# Patient Record
Sex: FEMALE | Race: WHITE | Employment: OTHER | URBAN - METROPOLITAN AREA
[De-identification: names, ages, dates, MRNs, and addresses within clinical notes are randomized per-mention and may not be internally consistent; named-entity substitution may affect disease eponyms.]

---

## 2021-03-17 ENCOUNTER — TRANSCRIBE ORDER (OUTPATIENT)
Dept: REGISTRATION | Age: 71
End: 2021-03-17

## 2021-03-17 DIAGNOSIS — Z01.812 PRE-PROCEDURE LAB EXAM: Primary | ICD-10-CM

## 2021-03-23 ENCOUNTER — HOSPITAL ENCOUNTER (OUTPATIENT)
Dept: PREADMISSION TESTING | Age: 71
Discharge: HOME | End: 2021-03-23
Payer: MEDICARE

## 2021-03-23 VITALS
DIASTOLIC BLOOD PRESSURE: 76 MMHG | HEART RATE: 71 BPM | TEMPERATURE: 97.4 F | SYSTOLIC BLOOD PRESSURE: 127 MMHG | HEIGHT: 65 IN

## 2021-03-23 LAB
ABO + RH BLD: NORMAL
ANION GAP SERPL CALC-SCNC: 6 MMOL/L (ref 5–15)
APPEARANCE UR: CLEAR
BACTERIA URNS QL MICRO: NEGATIVE /HPF
BILIRUB UR QL: NEGATIVE
BLOOD GROUP ANTIBODIES SERPL: NORMAL
BUN SERPL-MCNC: 14 MG/DL (ref 6–20)
BUN/CREAT SERPL: 23 (ref 12–20)
CALCIUM SERPL-MCNC: 9.1 MG/DL (ref 8.5–10.1)
CHLORIDE SERPL-SCNC: 107 MMOL/L (ref 97–108)
CO2 SERPL-SCNC: 27 MMOL/L (ref 21–32)
COLOR UR: NORMAL
CREAT SERPL-MCNC: 0.61 MG/DL (ref 0.55–1.02)
EPITH CASTS URNS QL MICRO: NORMAL /LPF
ERYTHROCYTE [DISTWIDTH] IN BLOOD BY AUTOMATED COUNT: 12.6 % (ref 11.5–14.5)
GLUCOSE SERPL-MCNC: 84 MG/DL (ref 65–100)
GLUCOSE UR STRIP.AUTO-MCNC: NEGATIVE MG/DL
HCT VFR BLD AUTO: 41.5 % (ref 35–47)
HGB BLD-MCNC: 13.5 G/DL (ref 11.5–16)
HGB UR QL STRIP: NEGATIVE
INR PPP: 1 (ref 0.9–1.1)
KETONES UR QL STRIP.AUTO: NEGATIVE MG/DL
LEUKOCYTE ESTERASE UR QL STRIP.AUTO: NEGATIVE
MCH RBC QN AUTO: 29.6 PG (ref 26–34)
MCHC RBC AUTO-ENTMCNC: 32.5 G/DL (ref 30–36.5)
MCV RBC AUTO: 91 FL (ref 80–99)
NITRITE UR QL STRIP.AUTO: NEGATIVE
NRBC # BLD: 0 K/UL (ref 0–0.01)
NRBC BLD-RTO: 0 PER 100 WBC
PH UR STRIP: 6 [PH] (ref 5–8)
PLATELET # BLD AUTO: 335 K/UL (ref 150–400)
PMV BLD AUTO: 10 FL (ref 8.9–12.9)
POTASSIUM SERPL-SCNC: 4.1 MMOL/L (ref 3.5–5.1)
PROT UR STRIP-MCNC: NEGATIVE MG/DL
PROTHROMBIN TIME: 10.4 SEC (ref 9–11.1)
RBC # BLD AUTO: 4.56 M/UL (ref 3.8–5.2)
RBC #/AREA URNS HPF: NORMAL /HPF (ref 0–5)
SODIUM SERPL-SCNC: 140 MMOL/L (ref 136–145)
SP GR UR REFRACTOMETRY: 1.01 (ref 1–1.03)
SPECIMEN EXP DATE BLD: NORMAL
UA: UC IF INDICATED,UAUC: NORMAL
UROBILINOGEN UR QL STRIP.AUTO: 0.2 EU/DL (ref 0.2–1)
WBC # BLD AUTO: 6.7 K/UL (ref 3.6–11)
WBC URNS QL MICRO: NORMAL /HPF (ref 0–4)

## 2021-03-23 PROCEDURE — 80048 BASIC METABOLIC PNL TOTAL CA: CPT

## 2021-03-23 PROCEDURE — 81001 URINALYSIS AUTO W/SCOPE: CPT

## 2021-03-23 PROCEDURE — 93005 ELECTROCARDIOGRAM TRACING: CPT

## 2021-03-23 PROCEDURE — 85027 COMPLETE CBC AUTOMATED: CPT

## 2021-03-23 PROCEDURE — 85610 PROTHROMBIN TIME: CPT

## 2021-03-23 PROCEDURE — 83036 HEMOGLOBIN GLYCOSYLATED A1C: CPT

## 2021-03-23 PROCEDURE — 36415 COLL VENOUS BLD VENIPUNCTURE: CPT

## 2021-03-23 PROCEDURE — 86901 BLOOD TYPING SEROLOGIC RH(D): CPT

## 2021-03-23 RX ORDER — DEXTROMETHORPHAN HYDROBROMIDE, GUAIFENESIN 5; 100 MG/5ML; MG/5ML
650 LIQUID ORAL EVERY 8 HOURS
COMMUNITY

## 2021-03-23 RX ORDER — ACETAMINOPHEN 500 MG
1000 TABLET ORAL
COMMUNITY

## 2021-03-23 RX ORDER — FLUTICASONE PROPIONATE 50 MCG
2 SPRAY, SUSPENSION (ML) NASAL AS NEEDED
COMMUNITY

## 2021-03-23 RX ORDER — MECLIZINE HYDROCHLORIDE 25 MG/1
25 TABLET ORAL
COMMUNITY

## 2021-03-23 RX ORDER — METRONIDAZOLE 7.5 MG/G
0.75 CREAM TOPICAL 2 TIMES DAILY
COMMUNITY

## 2021-03-23 RX ORDER — BISMUTH SUBSALICYLATE 262 MG
1 TABLET,CHEWABLE ORAL DAILY
COMMUNITY

## 2021-03-23 RX ORDER — GUAIFENESIN 600 MG/1
600 TABLET, EXTENDED RELEASE ORAL AS NEEDED
COMMUNITY

## 2021-03-23 RX ORDER — PHENOL/SODIUM PHENOLATE
20 AEROSOL, SPRAY (ML) MUCOUS MEMBRANE DAILY
COMMUNITY

## 2021-03-23 RX ORDER — LIDOCAINE HYDROCHLORIDE 30 MG/G
CREAM TOPICAL 2 TIMES DAILY
COMMUNITY

## 2021-03-23 NOTE — PERIOP NOTES
PATIENT GIVEN SURGICAL SITE INFECTION FAQ HANDOUT AND HAND WASHING TIP SHEET. PREOP INSTRUCTIONS REVIEWED AND PATIENT VERBALIZES UNDERSTANDING OF INSTRUCTIONS. PATIENT HAS BEEN GIVEN THE OPPORTUNITY TO ASK ADDITIONAL QUESTIONS. GAVE PATIENT 2 BOTTLES OF CHG CLEANSER AND PATIENT VERBALIZED UNDERSTANDING. PATIENT CALLED AND MADE AWARE OF COVID-19 TESTING NEEDED TO BE DONE WITHIN 96 HOURS OF SURGERY. COVID-19 TESTING APPOINTMENT MADE FOR PATIENT. PATIENT INSTRUCTED ON SELF QUARANTINE BETWEEN TESTING AND ARRIVAL TIME DAY OF SURGERY. SENT REFERRAL TO [facility]. GAVE PATIENT PAPERWORK ON ADVANCED DIRECTIVES    PATIENT INFORMED OF COVID 23 VISIT POLICY, GAVE PATIENT LINK TO JOINT REPLACEMENT CLASS AND PATIENT SAYS SHE HAS ACCESS TO COMPUTER AND WILL DO ONLINE BEFORE SURGERY. PATIENT WILL GET WHEELCHAIR WITH WHEELS PRIOR TO SURGERY.

## 2021-03-24 LAB
ATRIAL RATE: 64 BPM
BACTERIA SPEC CULT: NORMAL
BACTERIA SPEC CULT: NORMAL
CALCULATED P AXIS, ECG09: 56 DEGREES
CALCULATED R AXIS, ECG10: -38 DEGREES
CALCULATED T AXIS, ECG11: 41 DEGREES
DIAGNOSIS, 93000: NORMAL
EST. AVERAGE GLUCOSE BLD GHB EST-MCNC: 114 MG/DL
HBA1C MFR BLD: 5.6 % (ref 4–5.6)
P-R INTERVAL, ECG05: 158 MS
Q-T INTERVAL, ECG07: 438 MS
QRS DURATION, ECG06: 106 MS
QTC CALCULATION (BEZET), ECG08: 451 MS
SERVICE CMNT-IMP: NORMAL
VENTRICULAR RATE, ECG03: 64 BPM

## 2021-03-24 NOTE — PERIOP NOTES
[facility] Nurse Practitioner   Pre-Operative Chart Review/Assessment:-ORTHOPEDIC                Patient Name:  Mireya Beyer                                                           Age:   66 y.o.    :  1950     Today's Date:  3/25/2021     Date of PAT:   3/23/2021      Date of Surgery:    3/31/2021      Procedure(s):  Left Total Knee Arthroplasty     Surgeon:   Dr. Beth Gamble                       PLAN:      1)  Medical Clearance:  Dr. Chema Ng      2)  Cardiac Clearance:  EKG and METs reviewed. No further cardiac testing requested. 3)  Diabetic Treatment Consult:  Not indicated. A1c-5.6      4)  Sleep Apnea evaluation:   HOSSEIN score of 5. Pt reports daytime fatigue and a diagnosis of HTN. Pt denies loud snoring that can be heard through a closed door and witnessed pauses in breathing. Referral sent to PCP for F/U and recommendations. 5) Treatment for MRSA/Staph Aureus:  Neg      6) Additional Concerns:  HTN, GERD/PUD, Asthma                Vital Signs:         Vitals:    21 1137   BP: 127/76   Pulse: 71   Temp: 97.4 °F (36.3 °C)   Height: 5' 5\" (1.651 m)            ____________________________________________  PAST MEDICAL HISTORY  Past Medical History:   Diagnosis Date    Asthma     Cancer ([facility] [address]      BCC X [facility] - DR. RAPHAEL     GERD (gastroesophageal reflux disease)     History of bronchitis     Hypertension     PUD (peptic ulcer disease)     Vertigo       ____________________________________________  PAST SURGICAL HISTORY  Past Surgical History:   Procedure Laterality Date    HX COLONOSCOPY      HX ENDOSCOPY      HX GYN  1975    OVARIAN CYST     HX HEART CATHETERIZATION      DR. Beth Gamble -[facility]OLOG[facility]    HX ORTHOPAEDIC Left 2012    LEFT KNEE SURGERY - TORN MENISCUS REPAIR    NEUROLOGICAL PROCEDURE UNLISTED      LUMBAR LAMINECTOMY L4 - DR. ALANIZ (NEURO) HERNIATED DISC REMOVED      ____________________________________________  [place] MEDICATIONS  Current Outpatient Medications   Medication Sig    hydroCHLOROthiazide 25 mg tab 25 mg, lisinopriL 20 mg tab 20 mg Take 1 Dose by mouth daily. PATIENT TAKES 1/2 PILL EVERY MORNING    Omeprazole delayed release (PRILOSEC D/R) 20 mg tablet Take 20 mg by mouth daily.  metroNIDAZOLE (METROCREAM) 0.75 % topical cream Apply 0.75 Tubes to affected area two (2) times a day. Use a thin layer to affected areas after washing    acetaminophen (Tylenol Extra Strength) 500 mg tablet Take 1,000 mg by mouth every six (6) hours as needed for Pain.  acetaminophen (Tylenol Arthritis Pain) 650 mg TbER Take 650 mg by mouth every eight (8) hours.  lidocaine HCL (XYLOCAINE) 3 % topical cream Apply  to affected area two (2) times a day.  multivitamin (ONE A DAY) tablet Take 1 Tab by mouth daily.  OTHER 2 Puffs every six (6) hours. LEVALBUTEROL TARTRATE INHALER  (200 PUFFS)    meclizine (ANTIVERT) 25 mg tablet Take 25 mg by mouth three (3) times daily as needed for Dizziness. Indications: sensation of spinning or whirling    guaiFENesin ER (Mucinex) 600 mg ER tablet Take 600 mg by mouth as needed for Congestion.  fluticasone propionate (Flonase Allergy Relief) 50 mcg/actuation nasal spray 2 Sprays by Both Nostrils route as needed.  As needed     No current facility-administered medications for this encounter.       ____________________________________________  ALLERGIES  Allergies   Allergen Reactions    Mold Cough     AND NOSE (NASAL PASSAGEWAYS -BURNS )    Penicillin G Rash    Sulfa (Sulfonamide Antibiotics) Rash      ____________________________________________  SOCIAL HISTORY  Social History     Tobacco Use    Smoking status: Never Smoker    Smokeless tobacco: Never Used   Substance Use Topics    Alcohol use: Never     Frequency: Never      ____________________________________________        Labs:     Hospital Outpatient Visit on 03/23/2021   Component Date Value Ref Range Status    Sodium 03/23/2021 140  136 - 145 mmol/L Final    Potassium 03/23/2021 4.1  3.5 - 5.1 mmol/L Final    Chloride 03/23/2021 107  97 - 108 mmol/L Final    CO2 03/23/2021 27  21 - 32 mmol/L Final    Anion gap 03/23/2021 6  5 - 15 mmol/L Final    Glucose 03/23/2021 84  65 - 100 mg/dL Final    BUN 03/23/2021 14  6 - 20 MG/DL Final    Creatinine 03/23/2021 0.61  0.55 - 1.02 MG/DL Final    BUN/Creatinine ratio 03/23/2021 23* 12 - 20   Final    GFR est AA 03/23/2021 >60  >60 ml/min/1.73m2 Final    GFR est non-AA 03/23/2021 >60  >60 ml/min/1.73m2 Final    Estimated GFR is calculated using the IDMS-traceable Modification of Diet in Renal Disease (MDRD) Study equation, reported for both  Americans (GFRAA) and non- Americans (GFRNA), and normalized to 1.73m2 body surface area. The physician must decide which value applies to the patient.  Calcium 03/23/2021 9.1  8.5 - 10.1 MG/DL Final    WBC 03/23/2021 6.7  3.6 - 11.0 K/uL Final    RBC 03/23/2021 4.56  3.80 - 5.20 M/uL Final    HGB 03/23/2021 13.5  11.5 - 16.0 g/dL Final    HCT 03/23/2021 41.5  35.0 - 47.0 % Final    MCV 03/23/2021 91.0  80.0 - 99.0 FL Final    MCH 03/23/2021 29.6  26.0 - 34.0 PG Final    MCHC 03/23/2021 32.5  30.0 - 36.5 g/dL Final    RDW 03/23/2021 12.6  11.5 - 14.5 % Final    PLATELET 26/06/9094 236  150 - 400 K/uL Final    MPV 03/23/2021 10.0  8.9 - 12.9 FL Final    NRBC 03/23/2021 0.0  0  WBC Final    ABSOLUTE NRBC 03/23/2021 0.00  0.00 - 0.01 K/uL Final    Crossmatch Expiration 03/23/2021 04/03/2021,2359   Final    ABO/Rh(D) 03/23/2021 O NEGATIVE   Final    Antibody screen 03/23/2021 NEG   Final    INR 03/23/2021 1.0  0.9 - 1.1   Final    A single therapeutic range for Vit K antagonists may not be optimal for all indications - see June, 2008 issue of Chest, American College of Chest Physicians Evidence-Based Clinical Practice Guidelines, 8th Edition.     Prothrombin time 03/23/2021 10.4  9.0 - 11.1 sec Final    Color 03/23/2021 YELLOW/STRAW    Final    Color Reference Range: Straw, Yellow or Dark Yellow    Appearance 03/23/2021 CLEAR  CLEAR   Final    Specific gravity 03/23/2021 1.008  1.003 - 1.030   Final    pH (UA) 03/23/2021 6.0  5.0 - 8.0   Final    Protein 03/23/2021 Negative  NEG mg/dL Final    Glucose 03/23/2021 Negative  NEG mg/dL Final    Ketone 03/23/2021 Negative  NEG mg/dL Final    Bilirubin 03/23/2021 Negative  NEG   Final    Blood 03/23/2021 Negative  NEG   Final    Urobilinogen 03/23/2021 0.2  0.2 - 1.0 EU/dL Final    Nitrites 03/23/2021 Negative  NEG   Final    Leukocyte Esterase 03/23/2021 Negative  NEG   Final    WBC 03/23/2021 0-4  0 - 4 /hpf Final    RBC 03/23/2021 0-5  0 - 5 /hpf Final    Epithelial cells 03/23/2021 FEW  FEW /lpf Final    Epithelial cell category consists of squamous cells and /or transitional urothelial cells. Renal tubular cells, if present, are separately identified as such.     Bacteria 03/23/2021 Negative  NEG /hpf Final    UA:UC IF INDICATED 03/23/2021 CULTURE NOT INDICATED BY UA RESULT  CNI   Final    Ventricular Rate 03/23/2021 64  BPM Final    Atrial Rate 03/23/2021 64  BPM Final    P-R Interval 03/23/2021 158  ms Final    QRS Duration 03/23/2021 106  ms Final    Q-T Interval 03/23/2021 438  ms Final    QTC Calculation (Bezet) 03/23/2021 451  ms Final    Calculated P Axis 03/23/2021 56  degrees Final    Calculated R Axis 03/23/2021 -38  degrees Final    Calculated T Axis 03/23/2021 41  degrees Final    Diagnosis 03/23/2021    Final                    Value:Normal sinus rhythm  Left axis deviation      Confirmed by Cee Lebron MD. ([id number]) on 3/24/2021 6:02:37 PM      Hemoglobin A1c 03/23/2021 5.6  4.0 - 5.6 % Final    Comment: NEW METHOD  PLEASE NOTE NEW REFERENCE RANGE  (NOTE)  HbA1C Interpretive Ranges  <5.7              Normal  5.7 - 6.4         Consider Prediabetes  >6.5              Consider Diabetes      Est. average glucose 03/23/2021 114  mg/dL Final   • Special Requests: 03/23/2021 NO SPECIAL REQUESTS    Final   • Culture result: 03/23/2021 MRSA NOT PRESENT    Final       Skin:     Denies open wounds, cuts, sores, rashes or other areas of concern in PAT assessment.          Val Srinivasan NP

## 2021-03-26 PROBLEM — M17.12 PRIMARY LOCALIZED OSTEOARTHRITIS OF LEFT KNEE: Status: ACTIVE | Noted: 2021-03-26

## 2021-03-27 ENCOUNTER — HOSPITAL ENCOUNTER (OUTPATIENT)
Dept: PREADMISSION TESTING | Age: 71
Discharge: HOME | End: 2021-03-27
Payer: MEDICARE

## 2021-03-27 DIAGNOSIS — Z01.812 PRE-PROCEDURE LAB EXAM: ICD-10-CM

## 2021-03-27 PROCEDURE — U0003 INFECTIOUS AGENT DETECTION BY NUCLEIC ACID (DNA OR RNA); SEVERE ACUTE RESPIRATORY SYNDROME CORONAVIRUS 2 (SARS-COV-2) (CORONAVIRUS DISEASE [COVID-19]), AMPLIFIED PROBE TECHNIQUE, MAKING USE OF HIGH THROUGHPUT TECHNOLOGIES AS DESCRIBED BY CMS-2020-01-R: HCPCS

## 2021-03-28 LAB — SARS-COV-2, COV2NT: NOT DETECTED

## 2021-03-31 ENCOUNTER — ANESTHESIA (OUTPATIENT)
Dept: SURGERY | Age: 71
DRG: 470 | End: 2021-03-31
Payer: MEDICARE

## 2021-03-31 ENCOUNTER — HOSPITAL ENCOUNTER (INPATIENT)
Age: 71
LOS: 1 days | Discharge: HOME HEALTH | DRG: 470 | End: 2021-04-01
Attending: ORTHOPAEDIC SURGERY | Admitting: ORTHOPAEDIC SURGERY
Payer: MEDICARE

## 2021-03-31 ENCOUNTER — APPOINTMENT (OUTPATIENT)
Dept: GENERAL RADIOLOGY | Age: 71
DRG: 470 | End: 2021-03-31
Attending: PHYSICIAN ASSISTANT
Payer: MEDICARE

## 2021-03-31 ENCOUNTER — ANESTHESIA EVENT (OUTPATIENT)
Dept: SURGERY | Age: 71
DRG: 470 | End: 2021-03-31
Payer: MEDICARE

## 2021-03-31 DIAGNOSIS — Z96.659 STATUS POST KNEE REPLACEMENT, UNSPECIFIED LATERALITY: Primary | ICD-10-CM

## 2021-03-31 LAB
GLUCOSE BLD STRIP.AUTO-MCNC: 115 MG/DL (ref 65–100)
SERVICE CMNT-IMP: ABNORMAL

## 2021-03-31 PROCEDURE — C1776 JOINT DEVICE (IMPLANTABLE): HCPCS | Performed by: ORTHOPAEDIC SURGERY

## 2021-03-31 PROCEDURE — 97530 THERAPEUTIC ACTIVITIES: CPT

## 2021-03-31 PROCEDURE — 77030003601 HC NDL NRV BLK BBMI -A

## 2021-03-31 PROCEDURE — 77030008684 HC TU ET CUF COVD -B: Performed by: ANESTHESIOLOGY

## 2021-03-31 PROCEDURE — 77030012935 HC DRSG AQUACEL BMS -B: Performed by: ORTHOPAEDIC SURGERY

## 2021-03-31 PROCEDURE — 74011250636 HC RX REV CODE- 250/636: Performed by: NURSE ANESTHETIST, CERTIFIED REGISTERED

## 2021-03-31 PROCEDURE — 2709999900 HC NON-CHARGEABLE SUPPLY

## 2021-03-31 PROCEDURE — 74011000258 HC RX REV CODE- 258: Performed by: PHYSICIAN ASSISTANT

## 2021-03-31 PROCEDURE — 97161 PT EVAL LOW COMPLEX 20 MIN: CPT

## 2021-03-31 PROCEDURE — 65270000029 HC RM PRIVATE

## 2021-03-31 PROCEDURE — 77030014077 HC TOWER MX CEM J&J -C: Performed by: ORTHOPAEDIC SURGERY

## 2021-03-31 PROCEDURE — 74011250636 HC RX REV CODE- 250/636

## 2021-03-31 PROCEDURE — C1713 ANCHOR/SCREW BN/BN,TIS/BN: HCPCS | Performed by: ORTHOPAEDIC SURGERY

## 2021-03-31 PROCEDURE — 74011250637 HC RX REV CODE- 250/637: Performed by: ANESTHESIOLOGY

## 2021-03-31 PROCEDURE — C9290 INJ, BUPIVACAINE LIPOSOME: HCPCS | Performed by: PHYSICIAN ASSISTANT

## 2021-03-31 PROCEDURE — 77030040922 HC BLNKT HYPOTHRM STRY -A

## 2021-03-31 PROCEDURE — 76210000000 HC OR PH I REC 2 TO 2.5 HR: Performed by: ORTHOPAEDIC SURGERY

## 2021-03-31 PROCEDURE — 74011000250 HC RX REV CODE- 250: Performed by: PHYSICIAN ASSISTANT

## 2021-03-31 PROCEDURE — 74011250636 HC RX REV CODE- 250/636: Performed by: PHYSICIAN ASSISTANT

## 2021-03-31 PROCEDURE — 77030018673: Performed by: ORTHOPAEDIC SURGERY

## 2021-03-31 PROCEDURE — 77030039497 HC CST PAD STERILE CHCS -A: Performed by: ORTHOPAEDIC SURGERY

## 2021-03-31 PROCEDURE — 76010000172 HC OR TIME 2.5 TO 3 HR INTENSV-TIER 1: Performed by: ORTHOPAEDIC SURGERY

## 2021-03-31 PROCEDURE — 74011250636 HC RX REV CODE- 250/636: Performed by: ANESTHESIOLOGY

## 2021-03-31 PROCEDURE — 77030012893

## 2021-03-31 PROCEDURE — 77030027138 HC INCENT SPIROMETER -A

## 2021-03-31 PROCEDURE — 77030005513 HC CATH URETH FOL11 MDII -B: Performed by: ORTHOPAEDIC SURGERY

## 2021-03-31 PROCEDURE — 77030006835 HC BLD SAW SAG STRY -B: Performed by: ORTHOPAEDIC SURGERY

## 2021-03-31 PROCEDURE — 77030019908 HC STETH ESOPH SIMS -A: Performed by: ANESTHESIOLOGY

## 2021-03-31 PROCEDURE — 97116 GAIT TRAINING THERAPY: CPT

## 2021-03-31 PROCEDURE — 76060000036 HC ANESTHESIA 2.5 TO 3 HR: Performed by: ORTHOPAEDIC SURGERY

## 2021-03-31 PROCEDURE — 82962 GLUCOSE BLOOD TEST: CPT

## 2021-03-31 PROCEDURE — 74011250637 HC RX REV CODE- 250/637: Performed by: PHYSICIAN ASSISTANT

## 2021-03-31 PROCEDURE — 73560 X-RAY EXAM OF KNEE 1 OR 2: CPT

## 2021-03-31 PROCEDURE — 77030031139 HC SUT VCRL2 J&J -A: Performed by: ORTHOPAEDIC SURGERY

## 2021-03-31 PROCEDURE — 77030008462 HC STPLR SKN PROX J&J -A: Performed by: ORTHOPAEDIC SURGERY

## 2021-03-31 PROCEDURE — 77030026438 HC STYL ET INTUB CARD -A: Performed by: ANESTHESIOLOGY

## 2021-03-31 PROCEDURE — 74011000250 HC RX REV CODE- 250: Performed by: NURSE ANESTHETIST, CERTIFIED REGISTERED

## 2021-03-31 PROCEDURE — 77030040361 HC SLV COMPR DVT MDII -B: Performed by: ORTHOPAEDIC SURGERY

## 2021-03-31 PROCEDURE — 77030035236 HC SUT PDS STRATFX BARB J&J -B: Performed by: ORTHOPAEDIC SURGERY

## 2021-03-31 PROCEDURE — 2709999900 HC NON-CHARGEABLE SUPPLY: Performed by: ORTHOPAEDIC SURGERY

## 2021-03-31 PROCEDURE — 77030028907 HC WRP KNEE WO BGS SOLM -B

## 2021-03-31 DEVICE — STEM FEM 14X30MM CEM ATTUNE: Type: IMPLANTABLE DEVICE | Site: KNEE | Status: FUNCTIONAL

## 2021-03-31 DEVICE — SMARTSET GHV GENTAMICIN HIGH VISCOSITY BONE CEMENT 40G
Type: IMPLANTABLE DEVICE | Site: KNEE | Status: FUNCTIONAL
Brand: SMARTSET

## 2021-03-31 DEVICE — BASEPLATE TIB SZ 5 FIX BEAR CO CHROM MOLYBDENUM TI ALLY END: Type: IMPLANTABLE DEVICE | Site: KNEE | Status: FUNCTIONAL

## 2021-03-31 DEVICE — INSERT TIB SZ 6 THK7MM KNEE POST STBL FIX BEAR ATTUNE: Type: IMPLANTABLE DEVICE | Site: KNEE | Status: FUNCTIONAL

## 2021-03-31 DEVICE — COMPONENT FEM SZ 6 L KNEE POST STBL CEM ATTUNE: Type: IMPLANTABLE DEVICE | Site: KNEE | Status: FUNCTIONAL

## 2021-03-31 DEVICE — KNEE K1 TOT HEMI STD CEM IMPL CAPPED SYNTHES: Type: IMPLANTABLE DEVICE | Site: KNEE | Status: FUNCTIONAL

## 2021-03-31 DEVICE — ATTUNE PATELLA MEDIALIZED DOME 35MM CEMENTED AOX
Type: IMPLANTABLE DEVICE | Site: KNEE | Status: FUNCTIONAL
Brand: ATTUNE

## 2021-03-31 RX ORDER — NEOSTIGMINE METHYLSULFATE 1 MG/ML
INJECTION INTRAVENOUS AS NEEDED
Status: DISCONTINUED | OUTPATIENT
Start: 2021-03-31 | End: 2021-03-31 | Stop reason: HOSPADM

## 2021-03-31 RX ORDER — OXYCODONE HYDROCHLORIDE 5 MG/1
10 TABLET ORAL
Status: DISCONTINUED | OUTPATIENT
Start: 2021-03-31 | End: 2021-04-01 | Stop reason: HOSPADM

## 2021-03-31 RX ORDER — SODIUM CHLORIDE 0.9 % (FLUSH) 0.9 %
5-40 SYRINGE (ML) INJECTION AS NEEDED
Status: DISCONTINUED | OUTPATIENT
Start: 2021-03-31 | End: 2021-03-31 | Stop reason: HOSPADM

## 2021-03-31 RX ORDER — SODIUM CHLORIDE 0.9 % (FLUSH) 0.9 %
5-40 SYRINGE (ML) INJECTION AS NEEDED
Status: DISCONTINUED | OUTPATIENT
Start: 2021-03-31 | End: 2021-04-01 | Stop reason: HOSPADM

## 2021-03-31 RX ORDER — FENTANYL CITRATE 50 UG/ML
INJECTION, SOLUTION INTRAMUSCULAR; INTRAVENOUS
Status: COMPLETED
Start: 2021-03-31 | End: 2021-03-31

## 2021-03-31 RX ORDER — SODIUM CHLORIDE 0.9 % (FLUSH) 0.9 %
5-40 SYRINGE (ML) INJECTION EVERY 8 HOURS
Status: DISCONTINUED | OUTPATIENT
Start: 2021-03-31 | End: 2021-03-31 | Stop reason: HOSPADM

## 2021-03-31 RX ORDER — KETAMINE HYDROCHLORIDE 10 MG/ML
INJECTION, SOLUTION INTRAMUSCULAR; INTRAVENOUS AS NEEDED
Status: DISCONTINUED | OUTPATIENT
Start: 2021-03-31 | End: 2021-03-31 | Stop reason: HOSPADM

## 2021-03-31 RX ORDER — LIDOCAINE HYDROCHLORIDE 10 MG/ML
0.1 INJECTION, SOLUTION EPIDURAL; INFILTRATION; INTRACAUDAL; PERINEURAL AS NEEDED
Status: DISCONTINUED | OUTPATIENT
Start: 2021-03-31 | End: 2021-03-31 | Stop reason: HOSPADM

## 2021-03-31 RX ORDER — CEFAZOLIN SODIUM 1 G/3ML
INJECTION, POWDER, FOR SOLUTION INTRAMUSCULAR; INTRAVENOUS AS NEEDED
Status: DISCONTINUED | OUTPATIENT
Start: 2021-03-31 | End: 2021-03-31

## 2021-03-31 RX ORDER — ONDANSETRON 2 MG/ML
4 INJECTION INTRAMUSCULAR; INTRAVENOUS
Status: DISPENSED | OUTPATIENT
Start: 2021-03-31 | End: 2021-04-01

## 2021-03-31 RX ORDER — LIDOCAINE HYDROCHLORIDE 20 MG/ML
INJECTION, SOLUTION EPIDURAL; INFILTRATION; INTRACAUDAL; PERINEURAL AS NEEDED
Status: DISCONTINUED | OUTPATIENT
Start: 2021-03-31 | End: 2021-03-31 | Stop reason: HOSPADM

## 2021-03-31 RX ORDER — ALBUTEROL SULFATE 0.83 MG/ML
2.5 SOLUTION RESPIRATORY (INHALATION)
Status: DISCONTINUED | OUTPATIENT
Start: 2021-04-01 | End: 2021-04-01 | Stop reason: HOSPADM

## 2021-03-31 RX ORDER — ROPIVACAINE HYDROCHLORIDE 5 MG/ML
30 INJECTION, SOLUTION EPIDURAL; INFILTRATION; PERINEURAL AS NEEDED
Status: DISCONTINUED | OUTPATIENT
Start: 2021-03-31 | End: 2021-03-31 | Stop reason: HOSPADM

## 2021-03-31 RX ORDER — ONDANSETRON 2 MG/ML
4 INJECTION INTRAMUSCULAR; INTRAVENOUS AS NEEDED
Status: DISCONTINUED | OUTPATIENT
Start: 2021-03-31 | End: 2021-03-31 | Stop reason: HOSPADM

## 2021-03-31 RX ORDER — FENTANYL CITRATE 50 UG/ML
INJECTION, SOLUTION INTRAMUSCULAR; INTRAVENOUS AS NEEDED
Status: DISCONTINUED | OUTPATIENT
Start: 2021-03-31 | End: 2021-03-31 | Stop reason: HOSPADM

## 2021-03-31 RX ORDER — MORPHINE SULFATE 2 MG/ML
2 INJECTION, SOLUTION INTRAMUSCULAR; INTRAVENOUS
Status: DISCONTINUED | OUTPATIENT
Start: 2021-03-31 | End: 2021-03-31 | Stop reason: HOSPADM

## 2021-03-31 RX ORDER — ONDANSETRON 2 MG/ML
INJECTION INTRAMUSCULAR; INTRAVENOUS
Status: COMPLETED
Start: 2021-03-31 | End: 2021-03-31

## 2021-03-31 RX ORDER — AMOXICILLIN 250 MG
1 CAPSULE ORAL 2 TIMES DAILY
Status: DISCONTINUED | OUTPATIENT
Start: 2021-03-31 | End: 2021-04-01 | Stop reason: HOSPADM

## 2021-03-31 RX ORDER — MIDAZOLAM HYDROCHLORIDE 1 MG/ML
1 INJECTION, SOLUTION INTRAMUSCULAR; INTRAVENOUS AS NEEDED
Status: DISCONTINUED | OUTPATIENT
Start: 2021-03-31 | End: 2021-03-31 | Stop reason: HOSPADM

## 2021-03-31 RX ORDER — PROPOFOL 10 MG/ML
INJECTION, EMULSION INTRAVENOUS AS NEEDED
Status: DISCONTINUED | OUTPATIENT
Start: 2021-03-31 | End: 2021-03-31 | Stop reason: HOSPADM

## 2021-03-31 RX ORDER — GLYCOPYRROLATE 0.2 MG/ML
INJECTION INTRAMUSCULAR; INTRAVENOUS AS NEEDED
Status: DISCONTINUED | OUTPATIENT
Start: 2021-03-31 | End: 2021-03-31 | Stop reason: HOSPADM

## 2021-03-31 RX ORDER — SODIUM CHLORIDE 9 MG/ML
125 INJECTION, SOLUTION INTRAVENOUS CONTINUOUS
Status: DISPENSED | OUTPATIENT
Start: 2021-03-31 | End: 2021-04-01

## 2021-03-31 RX ORDER — NALOXONE HYDROCHLORIDE 0.4 MG/ML
0.4 INJECTION, SOLUTION INTRAMUSCULAR; INTRAVENOUS; SUBCUTANEOUS AS NEEDED
Status: DISCONTINUED | OUTPATIENT
Start: 2021-03-31 | End: 2021-04-01 | Stop reason: HOSPADM

## 2021-03-31 RX ORDER — FLUTICASONE PROPIONATE 50 MCG
2 SPRAY, SUSPENSION (ML) NASAL
Status: DISCONTINUED | OUTPATIENT
Start: 2021-03-31 | End: 2021-04-01 | Stop reason: HOSPADM

## 2021-03-31 RX ORDER — SODIUM CHLORIDE 9 MG/ML
25 INJECTION, SOLUTION INTRAVENOUS CONTINUOUS
Status: DISCONTINUED | OUTPATIENT
Start: 2021-03-31 | End: 2021-03-31 | Stop reason: HOSPADM

## 2021-03-31 RX ORDER — MECLIZINE HCL 12.5 MG 12.5 MG/1
25 TABLET ORAL
Status: DISCONTINUED | OUTPATIENT
Start: 2021-03-31 | End: 2021-04-01 | Stop reason: HOSPADM

## 2021-03-31 RX ORDER — SODIUM CHLORIDE 0.9 % (FLUSH) 0.9 %
5-40 SYRINGE (ML) INJECTION EVERY 8 HOURS
Status: DISCONTINUED | OUTPATIENT
Start: 2021-03-31 | End: 2021-04-01 | Stop reason: HOSPADM

## 2021-03-31 RX ORDER — FENTANYL CITRATE 50 UG/ML
25 INJECTION, SOLUTION INTRAMUSCULAR; INTRAVENOUS
Status: DISCONTINUED | OUTPATIENT
Start: 2021-03-31 | End: 2021-03-31 | Stop reason: HOSPADM

## 2021-03-31 RX ORDER — HYDROMORPHONE HYDROCHLORIDE 2 MG/ML
INJECTION, SOLUTION INTRAMUSCULAR; INTRAVENOUS; SUBCUTANEOUS AS NEEDED
Status: DISCONTINUED | OUTPATIENT
Start: 2021-03-31 | End: 2021-03-31 | Stop reason: HOSPADM

## 2021-03-31 RX ORDER — MIDAZOLAM HYDROCHLORIDE 1 MG/ML
0.5 INJECTION, SOLUTION INTRAMUSCULAR; INTRAVENOUS
Status: DISCONTINUED | OUTPATIENT
Start: 2021-03-31 | End: 2021-03-31 | Stop reason: HOSPADM

## 2021-03-31 RX ORDER — OXYCODONE HYDROCHLORIDE 5 MG/1
5 TABLET ORAL AS NEEDED
Status: DISCONTINUED | OUTPATIENT
Start: 2021-03-31 | End: 2021-03-31 | Stop reason: HOSPADM

## 2021-03-31 RX ORDER — DEXAMETHASONE SODIUM PHOSPHATE 4 MG/ML
INJECTION, SOLUTION INTRA-ARTICULAR; INTRALESIONAL; INTRAMUSCULAR; INTRAVENOUS; SOFT TISSUE AS NEEDED
Status: DISCONTINUED | OUTPATIENT
Start: 2021-03-31 | End: 2021-03-31 | Stop reason: HOSPADM

## 2021-03-31 RX ORDER — ONDANSETRON 4 MG/1
4 TABLET, ORALLY DISINTEGRATING ORAL
Status: DISCONTINUED | OUTPATIENT
Start: 2021-03-31 | End: 2021-04-01 | Stop reason: HOSPADM

## 2021-03-31 RX ORDER — FAMOTIDINE 20 MG/1
20 TABLET, FILM COATED ORAL
Status: DISCONTINUED | OUTPATIENT
Start: 2021-03-31 | End: 2021-04-01 | Stop reason: HOSPADM

## 2021-03-31 RX ORDER — ACETAMINOPHEN 325 MG/1
650 TABLET ORAL ONCE
Status: COMPLETED | OUTPATIENT
Start: 2021-03-31 | End: 2021-03-31

## 2021-03-31 RX ORDER — POLYETHYLENE GLYCOL 3350 17 G/17G
17 POWDER, FOR SOLUTION ORAL DAILY
Status: DISCONTINUED | OUTPATIENT
Start: 2021-04-01 | End: 2021-04-01 | Stop reason: HOSPADM

## 2021-03-31 RX ORDER — FENTANYL CITRATE 50 UG/ML
50 INJECTION, SOLUTION INTRAMUSCULAR; INTRAVENOUS AS NEEDED
Status: DISCONTINUED | OUTPATIENT
Start: 2021-03-31 | End: 2021-03-31 | Stop reason: HOSPADM

## 2021-03-31 RX ORDER — ROPIVACAINE HYDROCHLORIDE 5 MG/ML
INJECTION, SOLUTION EPIDURAL; INFILTRATION; PERINEURAL
Status: COMPLETED | OUTPATIENT
Start: 2021-03-31 | End: 2021-03-31

## 2021-03-31 RX ORDER — HYDRALAZINE HYDROCHLORIDE 20 MG/ML
INJECTION INTRAMUSCULAR; INTRAVENOUS
Status: COMPLETED
Start: 2021-03-31 | End: 2021-03-31

## 2021-03-31 RX ORDER — PANTOPRAZOLE SODIUM 20 MG/1
20 TABLET, DELAYED RELEASE ORAL
Status: DISCONTINUED | OUTPATIENT
Start: 2021-04-01 | End: 2021-04-01 | Stop reason: HOSPADM

## 2021-03-31 RX ORDER — HYDROMORPHONE HYDROCHLORIDE 1 MG/ML
0.5 INJECTION, SOLUTION INTRAMUSCULAR; INTRAVENOUS; SUBCUTANEOUS
Status: ACTIVE | OUTPATIENT
Start: 2021-03-31 | End: 2021-04-01

## 2021-03-31 RX ORDER — DIPHENHYDRAMINE HYDROCHLORIDE 50 MG/ML
12.5 INJECTION, SOLUTION INTRAMUSCULAR; INTRAVENOUS AS NEEDED
Status: DISCONTINUED | OUTPATIENT
Start: 2021-03-31 | End: 2021-03-31 | Stop reason: HOSPADM

## 2021-03-31 RX ORDER — OXYCODONE HYDROCHLORIDE 5 MG/1
5 TABLET ORAL
Status: DISCONTINUED | OUTPATIENT
Start: 2021-03-31 | End: 2021-04-01 | Stop reason: HOSPADM

## 2021-03-31 RX ORDER — SODIUM CHLORIDE, SODIUM LACTATE, POTASSIUM CHLORIDE, CALCIUM CHLORIDE 600; 310; 30; 20 MG/100ML; MG/100ML; MG/100ML; MG/100ML
100 INJECTION, SOLUTION INTRAVENOUS CONTINUOUS
Status: DISCONTINUED | OUTPATIENT
Start: 2021-03-31 | End: 2021-03-31 | Stop reason: HOSPADM

## 2021-03-31 RX ORDER — FACIAL-BODY WIPES
10 EACH TOPICAL DAILY PRN
Status: DISCONTINUED | OUTPATIENT
Start: 2021-04-02 | End: 2021-04-01 | Stop reason: HOSPADM

## 2021-03-31 RX ORDER — HYDROMORPHONE HYDROCHLORIDE 1 MG/ML
0.2 INJECTION, SOLUTION INTRAMUSCULAR; INTRAVENOUS; SUBCUTANEOUS
Status: DISCONTINUED | OUTPATIENT
Start: 2021-03-31 | End: 2021-03-31 | Stop reason: HOSPADM

## 2021-03-31 RX ORDER — ASPIRIN 325 MG
325 TABLET ORAL 2 TIMES DAILY
Status: DISCONTINUED | OUTPATIENT
Start: 2021-03-31 | End: 2021-04-01 | Stop reason: HOSPADM

## 2021-03-31 RX ORDER — ROCURONIUM BROMIDE 10 MG/ML
INJECTION, SOLUTION INTRAVENOUS AS NEEDED
Status: DISCONTINUED | OUTPATIENT
Start: 2021-03-31 | End: 2021-03-31 | Stop reason: HOSPADM

## 2021-03-31 RX ORDER — HYDROXYZINE HYDROCHLORIDE 10 MG/1
10 TABLET, FILM COATED ORAL
Status: DISCONTINUED | OUTPATIENT
Start: 2021-03-31 | End: 2021-04-01 | Stop reason: HOSPADM

## 2021-03-31 RX ORDER — MIDAZOLAM HYDROCHLORIDE 1 MG/ML
INJECTION, SOLUTION INTRAMUSCULAR; INTRAVENOUS AS NEEDED
Status: DISCONTINUED | OUTPATIENT
Start: 2021-03-31 | End: 2021-03-31 | Stop reason: HOSPADM

## 2021-03-31 RX ORDER — ACETAMINOPHEN 325 MG/1
650 TABLET ORAL EVERY 6 HOURS
Status: DISCONTINUED | OUTPATIENT
Start: 2021-03-31 | End: 2021-04-01 | Stop reason: HOSPADM

## 2021-03-31 RX ORDER — SODIUM CHLORIDE, SODIUM LACTATE, POTASSIUM CHLORIDE, CALCIUM CHLORIDE 600; 310; 30; 20 MG/100ML; MG/100ML; MG/100ML; MG/100ML
25 INJECTION, SOLUTION INTRAVENOUS CONTINUOUS
Status: DISCONTINUED | OUTPATIENT
Start: 2021-03-31 | End: 2021-03-31 | Stop reason: HOSPADM

## 2021-03-31 RX ORDER — METOPROLOL TARTRATE 5 MG/5ML
INJECTION INTRAVENOUS AS NEEDED
Status: DISCONTINUED | OUTPATIENT
Start: 2021-03-31 | End: 2021-03-31 | Stop reason: HOSPADM

## 2021-03-31 RX ORDER — ONDANSETRON 2 MG/ML
INJECTION INTRAMUSCULAR; INTRAVENOUS AS NEEDED
Status: DISCONTINUED | OUTPATIENT
Start: 2021-03-31 | End: 2021-03-31 | Stop reason: HOSPADM

## 2021-03-31 RX ORDER — HYDRALAZINE HYDROCHLORIDE 20 MG/ML
10 INJECTION INTRAMUSCULAR; INTRAVENOUS ONCE
Status: COMPLETED | OUTPATIENT
Start: 2021-03-31 | End: 2021-03-31

## 2021-03-31 RX ORDER — GUAIFENESIN 600 MG/1
600 TABLET, EXTENDED RELEASE ORAL
Status: DISCONTINUED | OUTPATIENT
Start: 2021-03-31 | End: 2021-04-01 | Stop reason: HOSPADM

## 2021-03-31 RX ADMIN — ASPIRIN 325 MG ORAL TABLET 325 MG: 325 PILL ORAL at 17:44

## 2021-03-31 RX ADMIN — METOPROLOL TARTRATE 2 MG: 5 INJECTION, SOLUTION INTRAVENOUS at 11:10

## 2021-03-31 RX ADMIN — FENTANYL CITRATE 25 MCG: 50 INJECTION, SOLUTION INTRAMUSCULAR; INTRAVENOUS at 13:05

## 2021-03-31 RX ADMIN — ONDANSETRON 4 MG: 2 INJECTION INTRAMUSCULAR; INTRAVENOUS at 13:07

## 2021-03-31 RX ADMIN — TRANEXAMIC ACID 1 G: 100 INJECTION, SOLUTION INTRAVENOUS at 09:52

## 2021-03-31 RX ADMIN — DEXAMETHASONE SODIUM PHOSPHATE 4 MG: 4 INJECTION, SOLUTION INTRAMUSCULAR; INTRAVENOUS at 09:33

## 2021-03-31 RX ADMIN — SODIUM CHLORIDE, POTASSIUM CHLORIDE, SODIUM LACTATE AND CALCIUM CHLORIDE 25 ML/HR: 600; 310; 30; 20 INJECTION, SOLUTION INTRAVENOUS at 08:59

## 2021-03-31 RX ADMIN — SODIUM CHLORIDE: 900 INJECTION, SOLUTION INTRAVENOUS at 11:37

## 2021-03-31 RX ADMIN — DOCUSATE SODIUM 50 MG AND SENNOSIDES 8.6 MG 1 TABLET: 8.6; 5 TABLET, FILM COATED ORAL at 17:44

## 2021-03-31 RX ADMIN — MIDAZOLAM 2 MG: 1 INJECTION INTRAMUSCULAR; INTRAVENOUS at 09:21

## 2021-03-31 RX ADMIN — WATER 2 G: 1 INJECTION INTRAMUSCULAR; INTRAVENOUS; SUBCUTANEOUS at 09:50

## 2021-03-31 RX ADMIN — FENTANYL CITRATE 25 MCG: 50 INJECTION, SOLUTION INTRAMUSCULAR; INTRAVENOUS at 13:40

## 2021-03-31 RX ADMIN — ACETAMINOPHEN 650 MG: 325 TABLET ORAL at 15:37

## 2021-03-31 RX ADMIN — ACETAMINOPHEN 650 MG: 325 TABLET ORAL at 08:13

## 2021-03-31 RX ADMIN — Medication 10 ML: at 23:20

## 2021-03-31 RX ADMIN — METOPROLOL TARTRATE 2 MG: 5 INJECTION, SOLUTION INTRAVENOUS at 10:50

## 2021-03-31 RX ADMIN — HYDRALAZINE HYDROCHLORIDE 10 MG: 20 INJECTION INTRAMUSCULAR; INTRAVENOUS at 13:06

## 2021-03-31 RX ADMIN — CEFAZOLIN SODIUM 2 G: 1 INJECTION, POWDER, FOR SOLUTION INTRAMUSCULAR; INTRAVENOUS at 23:08

## 2021-03-31 RX ADMIN — NEOSTIGMINE METHYLSULFATE 3 MG: 1 INJECTION, SOLUTION INTRAVENOUS at 11:31

## 2021-03-31 RX ADMIN — HYDROMORPHONE HYDROCHLORIDE 0.5 MG: 2 INJECTION, SOLUTION INTRAMUSCULAR; INTRAVENOUS; SUBCUTANEOUS at 11:15

## 2021-03-31 RX ADMIN — MIDAZOLAM 0.5 MG: 1 INJECTION INTRAMUSCULAR; INTRAVENOUS at 13:50

## 2021-03-31 RX ADMIN — FENTANYL CITRATE 50 MCG: 50 INJECTION, SOLUTION INTRAMUSCULAR; INTRAVENOUS at 09:28

## 2021-03-31 RX ADMIN — ONDANSETRON 4 MG: 2 INJECTION INTRAMUSCULAR; INTRAVENOUS at 15:52

## 2021-03-31 RX ADMIN — SODIUM CHLORIDE 125 ML/HR: 9 INJECTION, SOLUTION INTRAVENOUS at 19:48

## 2021-03-31 RX ADMIN — MIDAZOLAM HYDROCHLORIDE 2 MG: 1 INJECTION, SOLUTION INTRAMUSCULAR; INTRAVENOUS at 08:45

## 2021-03-31 RX ADMIN — ACETAMINOPHEN 650 MG: 325 TABLET ORAL at 20:15

## 2021-03-31 RX ADMIN — GLYCOPYRROLATE 0.4 MG: 0.2 INJECTION, SOLUTION INTRAMUSCULAR; INTRAVENOUS at 11:31

## 2021-03-31 RX ADMIN — KETAMINE HYDROCHLORIDE 30 MG: 10 INJECTION, SOLUTION INTRAMUSCULAR; INTRAVENOUS at 09:28

## 2021-03-31 RX ADMIN — ONDANSETRON HYDROCHLORIDE 4 MG: 2 INJECTION, SOLUTION INTRAMUSCULAR; INTRAVENOUS at 11:31

## 2021-03-31 RX ADMIN — METOPROLOL TARTRATE 1 MG: 5 INJECTION, SOLUTION INTRAVENOUS at 12:14

## 2021-03-31 RX ADMIN — PROPOFOL 130 MG: 10 INJECTION, EMULSION INTRAVENOUS at 09:28

## 2021-03-31 RX ADMIN — ROPIVACAINE HYDROCHLORIDE 20 ML: 5 INJECTION, SOLUTION EPIDURAL; INFILTRATION; PERINEURAL at 08:59

## 2021-03-31 RX ADMIN — FENTANYL CITRATE 50 MCG: 50 INJECTION, SOLUTION INTRAMUSCULAR; INTRAVENOUS at 10:25

## 2021-03-31 RX ADMIN — OXYCODONE 5 MG: 5 TABLET ORAL at 22:43

## 2021-03-31 RX ADMIN — HYDROMORPHONE HYDROCHLORIDE 0.5 MG: 2 INJECTION, SOLUTION INTRAMUSCULAR; INTRAVENOUS; SUBCUTANEOUS at 09:38

## 2021-03-31 RX ADMIN — CEFAZOLIN SODIUM 2 G: 1 INJECTION, POWDER, FOR SOLUTION INTRAMUSCULAR; INTRAVENOUS at 15:38

## 2021-03-31 RX ADMIN — LIDOCAINE HYDROCHLORIDE 40 MG: 20 INJECTION, SOLUTION EPIDURAL; INFILTRATION; INTRACAUDAL; PERINEURAL at 09:28

## 2021-03-31 RX ADMIN — FENTANYL CITRATE 50 MCG: 50 INJECTION, SOLUTION INTRAMUSCULAR; INTRAVENOUS at 08:45

## 2021-03-31 RX ADMIN — ROCURONIUM BROMIDE 50 MG: 10 SOLUTION INTRAVENOUS at 09:28

## 2021-03-31 RX ADMIN — HYDROMORPHONE HYDROCHLORIDE 0.5 MG: 2 INJECTION, SOLUTION INTRAMUSCULAR; INTRAVENOUS; SUBCUTANEOUS at 10:27

## 2021-03-31 RX ADMIN — PROPOFOL 30 MG: 10 INJECTION, EMULSION INTRAVENOUS at 10:39

## 2021-03-31 RX ADMIN — PROPOFOL 20 MG: 10 INJECTION, EMULSION INTRAVENOUS at 10:25

## 2021-03-31 RX ADMIN — KETAMINE HYDROCHLORIDE 20 MG: 10 INJECTION, SOLUTION INTRAMUSCULAR; INTRAVENOUS at 10:39

## 2021-03-31 NOTE — ANESTHESIA POSTPROCEDURE EVALUATION
Post-Anesthesia Evaluation and Assessment    Patient: Adalgisa Zavala MRN: [id number]  SSN: [id number]    YOB: 1950  Age: 66 y.o. Sex: female      I have evaluated the patient and they are stable and ready for discharge from the PACU. Cardiovascular Function/Vital Signs  Visit Vitals  BP (!) 149/59   Pulse 90   Temp 36.4 °C (97.6 °F)   Resp 22   Ht 5' 5\" (1.651 m)   Wt 91.6 kg (202 lb)   SpO2 98%   BMI 33.61 kg/m²       Patient is status post General anesthesia for Procedure(s):  LEFT TOTAL KNEE ARTHROPLASTY. Nausea/Vomiting: None    Postoperative hydration reviewed and adequate. Pain:  Pain Scale 1: (pt states pain is at tolerable level) (03/31/21 1400)  Pain Intensity 1: 4 (03/31/21 1340)   Managed    Neurological Status:   Neuro (WDL): Exceptions to WDL (03/31/21 1400)  Neuro  Neurologic State: Drowsy (03/31/21 1400)  Orientation Level: Oriented X4 (03/31/21 1400)  Cognition: Follows commands (03/31/21 1400)  Speech: Clear (03/31/21 1400)  LUE Motor Response: Purposeful (03/31/21 1400)  LLE Motor Response: Purposeful (03/31/21 1400)  RUE Motor Response: Purposeful (03/31/21 1400)  RLE Motor Response: Purposeful (03/31/21 1400)   At baseline    Mental Status, Level of Consciousness: Alert and  oriented to person, place, and time    Pulmonary Status:   O2 Device: Nasal cannula (03/31/21 1400)   Adequate oxygenation and airway patent    Complications related to anesthesia: None    Post-anesthesia assessment completed. No concerns    Signed By: Fallon Sanchez MD     March 31, 2021              Procedure(s):  LEFT TOTAL KNEE ARTHROPLASTY. general    <BSHSIANPOST>    INITIAL Post-op Vital signs:   Vitals Value Taken Time   /59 03/31/21 1400   Temp 36.4 °C (97.6 °F) 03/31/21 1400   Pulse 90 03/31/21 1405   Resp 22 03/31/21 1405   SpO2 96 % 03/31/21 1408   Vitals shown include unvalidated device data.

## 2021-03-31 NOTE — ANESTHESIA PREPROCEDURE EVALUATION
Relevant Problems   No relevant active problems       Anesthetic History   No history of anesthetic complications            Review of Systems / Medical History  Patient summary reviewed, nursing notes reviewed and pertinent labs reviewed    Pulmonary            Asthma        Neuro/Psych   Within defined limits           Cardiovascular    Hypertension              Exercise tolerance: >4 METS     GI/Hepatic/Renal     GERD      PUD     Endo/Other        Arthritis     Other Findings              Physical Exam    Airway  Mallampati: III  TM Distance: 4 - 6 cm  Neck ROM: normal range of motion   Mouth opening: Normal     Cardiovascular  Regular rate and rhythm,  S1 and S2 normal,  no murmur, click, rub, or gallop             Dental  No notable dental hx       Pulmonary  Breath sounds clear to auscultation               Abdominal  GI exam deferred       Other Findings            Anesthetic Plan    ASA: 2  Anesthesia type: general      Post-op pain plan if not by surgeon: peripheral nerve block single    Induction: Intravenous  Anesthetic plan and risks discussed with: Patient      Patient refused spinal

## 2021-03-31 NOTE — ANESTHESIA PROCEDURE NOTES
Peripheral Block    Start time: 3/31/2021 8:54 AM  End time: 3/31/2021 8:58 AM  Performed by: Kayleigh Kelly MD  Authorized by: Kayleigh Kelly MD       Pre-procedure: Indications: at surgeon's request and post-op pain management    Preanesthetic Checklist: patient identified, risks and benefits discussed, site marked, timeout performed and patient being monitored      Block Type:   Block Type:   Adductor canal  Laterality:  Left  Monitoring:  Standard ASA monitoring, continuous pulse ox, frequent vital sign checks, heart rate, responsive to questions and oxygen  Injection Technique:  Single shot  Procedures: ultrasound guided    Patient Position: supine  Prep: chlorhexidine    Location:  Lower thigh  Needle Type:  Stimuplex  Needle Gauge:  22 G  Needle Localization:  Ultrasound guidance  Medication Injected:  Ropivacaine (PF) (NAROPIN)(0.5%) 5 mg/mL injection, 20 mL  Med Admin Time: 3/31/2021 8:59 AM    Assessment:  Number of attempts:  1  Injection Assessment:  Incremental injection every 5 mL, no paresthesia, ultrasound image on chart, no intravascular symptoms, negative aspiration for blood and local visualized surrounding nerve on ultrasound  Patient tolerance:  Patient tolerated the procedure well with no immediate complications

## 2021-03-31 NOTE — PROGRESS NOTES
TRANSFER - IN REPORT:    Verbal report received from Betsy Albright RN(name) on Moses Aguirre  being received from PACU(unit) for routine post - op      Report consisted of patients Situation, Background, Assessment and   Recommendations(SBAR). Information from the following report(s) SBAR, Kardex and OR Summary was reviewed with the receiving nurse. Opportunity for questions and clarification was provided. Assessment completed upon patients arrival to unit and care assumed.

## 2021-03-31 NOTE — H&P
Patient ID: Shubham Cardenas is a 65 y.o. female. Chief Complaint: Pain of the Left Knee    Shubham Cardenas is a 65 y.o. female who returns for follow up of left knee pain. She has a known history of DJD of the bilateral knees. Patient was last seen on 11/25/19, at which time I injected the left hip with cortisone for trochanteric bursitis. Today, she reports ongoing left knee pain. She notes instability in the left knee, particularly when going up steps. She is status post lumbar surgery at L4-5 for sciatica. She notes radicular LLE pain/numbness down her entire LE. Symptoms have been worsening over the past 3-4 months in relation to her sciatica and her arthritic knee pain. Review of Systems   2/22/2021    Constitutional: Unexplained: Negative  Genitourinary: Frequent Urination: Negative  HEENT: Vision Loss: Negative  Neurological: Memory Loss: Negative  Integumentary: Rash: Negative  Cardiovascular: Palpatations: Negative  Hematologic: Bruises/Bleeds Easily: Negative  Gastrointestinal: Constipation: Negative  Immunological: Seasonal Allergies: Positive  Musculoskeletal: Joint Pain: Positive    Past Medical History:   Diagnosis Date    Asthma    GERD (gastroesophageal reflux disease)    Hypertension     Past Surgical History:   Procedure Laterality Date    KNEE SURGERY    NO RELEVANT SURGERIES     Objective: There were no vitals filed for this visit. Constitutional: No acute distress. Well nourished. Well developed. Eyes: Sclera are nonicteric. Respiratory: No labored breathing. Cardiovascular: No marked edema. Skin: No marked skin ulcers. Neurological: No marked sensory loss noted. Psychiatric: Alert and oriented x3. Musculoskeletal     Examination of the LEFT knee reveals ROM is 2-119 degrees with a valgus deformity. Skin intact. The cruciate and collateral ligaments are stable. No effusion. No sign of infection. No ecchymosis or erythema. No cellulitis or rash.  No calf pain, swelling, or other evidence of DVT. I detect no obvious motor or sensory deficits in the lower extremities. The extensor mechanism is intact. The neurovascular status is intact. Imaging/Studies:   Order: XR KNEE 3 VW LEFT - Indication: Chronic pain of left knee      X-ray knee left 3 views ([id number])    Result Date: 2/22/2021  Weight Bearing. Views: Standing AP, Lat, [place]. Impression: I ordered and interpreted x-rays of the LEFT knee which reveal advanced degenerative arthritis in the medial, lateral, and patellofemoral compartments. Marked lateral comparment degenerative changes. Valgus defromity. No fractures or evidence of metastatic disease. Assessment:   There is no problem list on file for this patient. ICD-10-CM   1. Chronic pain of left knee M25.562   G89.29   2. Primary osteoarthritis of left knee M17.12   3. Acquired valgus deformity knee, left M21.062   4. Sciatica of left side M54.32     Plan:   I personally reviewed my findings with the patient. I reviewed the pathophysiology and explained that she has quite severe left knee osteoarthritis seen on x-rays today. This would explain why she is having knee pain. She does also have sciatica in relation to lumbar related issues causing radicular symptoms in her LLE. I advised her the area of numbness over her left lateral shin is likely stemming from her sciatica. We discussed long-term relief for her knee pain with left total knee arthroplasty. I explained the hospitalization, post-op and rehabilitation for the procedure. We discussed all complications associated with the surgery, including the chance of infection, DVT, and pulmonary embolus. I explained the use of antibiotics and anti-coagulants following surgery to prevent infection and DVT and we discussed the course of post-op physical therapy for rehabilitation. PROCEDURE: Left total knee replacement. Date of Surgery Update:  Mason Griffin was seen and examined.   Past Medical History:   Diagnosis Date    Asthma     Cancer ([facility] [address]      BCC X [facility] - DR. RAPHAEL     GERD (gastroesophageal reflux disease)     History of bronchitis     Hypertension     PUD (peptic ulcer disease)     Vertigo      Prior to Admission Medications   Prescriptions Last Dose Informant Patient Reported? Taking? OTHER   Yes No   Si Puffs every six (6) hours. LEVALBUTEROL TARTRATE INHALER  (200 PUFFS)   Omeprazole delayed release (PRILOSEC D/R) 20 mg tablet   Yes No   Sig: Take 20 mg by mouth daily. acetaminophen (Tylenol Arthritis Pain) 650 mg TbER   Yes No   Sig: Take 650 mg by mouth every eight (8) hours. acetaminophen (Tylenol Extra Strength) 500 mg tablet   Yes No   Sig: Take 1,000 mg by mouth every six (6) hours as needed for Pain. fluticasone propionate (Flonase Allergy Relief) 50 mcg/actuation nasal spray   Yes No   Si Sprays by Both Nostrils route as needed. As needed   guaiFENesin ER (Mucinex) 600 mg ER tablet   Yes No   Sig: Take 600 mg by mouth as needed for Congestion. hydroCHLOROthiazide 25 mg tab 25 mg, lisinopriL 20 mg tab 20 mg   Yes No   Sig: Take 1 Dose by mouth daily. PATIENT TAKES 1/2 PILL EVERY MORNING   lidocaine HCL (XYLOCAINE) 3 % topical cream   Yes No   Sig: Apply  to affected area two (2) times a day. meclizine (ANTIVERT) 25 mg tablet   Yes No   Sig: Take 25 mg by mouth three (3) times daily as needed for Dizziness. Indications: sensation of spinning or whirling   metroNIDAZOLE (METROCREAM) 0.75 % topical cream   Yes No   Sig: Apply 0.75 Tubes to affected area two (2) times a day. Use a thin layer to affected areas after washing   multivitamin (ONE A DAY) tablet   Yes No   Sig: Take 1 Tab by mouth daily. Facility-Administered Medications: None      Allergy to:Mold, Penicillin g, and Sulfa (sulfonamide antibiotics)  Physical Examination: General appearance - alert, well appearing, and in no distress  History and physical has been reviewed.  The patient has been examined.  There have been no significant clinical changes since the completion of the originally dated History and Physical.    Signed By: Easton Junior PA-C     March 31, 2021 7:33 AM

## 2021-03-31 NOTE — PERIOP NOTES
TRANSFER - OUT REPORT:    Verbal report given to Jann Chamorro (name) on Boone Elm  being transferred to  (unit) for routine post - op       Report consisted of patients Situation, Background, Assessment and   Recommendations(SBAR). Time Pre op antibiotic given:0950  Anesthesia Stop time: 0413  Mortensen Present on Transfer to floor:no  Order for Mortensen on Chart:no  Discharge Prescriptions with Chart:no    Information from the following report(s) SBAR, OR Summary, Procedure Summary, Intake/Output, MAR, Recent Results and Cardiac Rhythm NSR was reviewed with the receiving nurse. Opportunity for questions and clarification was provided. Is the patient on 02? YES       L/Min 2       Other nc    Is the patient on a monitor? NO    Is the nurse transporting with the patient? NO    Surgical Waiting Area notified of patient's transfer from PACU? YES      The following personal items collected during your admission accompanied patient upon transfer:   Dental Appliance: Dental Appliances: None  Vision: Visual Aid: Glasses(glasses to pacu) pt wearing upon exit from PACU  Hearing Aid:    Jewelry: Jewelry: None  Clothing: Clothing: Footwear, Pants, Shirt, Jacket/Coat(clothing to [employer]) bag of belongings with patient on stretcher in PACU  Other Valuables:  Other Valuables: Eyeglasses(glasses to pacu)  Valuables sent to safe:

## 2021-03-31 NOTE — BRIEF OP NOTE
Brief Postoperative Note    Patient: Charlotte Donald  YOB: 1950  MRN: [id number]    Date of Procedure: 3/31/2021     Pre-Op Diagnosis: DJD LEFT KNEE    Post-Op Diagnosis: Same as preoperative diagnosis. Procedure(s):  LEFT TOTAL KNEE ARTHROPLASTY    Surgeon(s):  Glenny Escobar MD    Surgical Assistant: Physician Assistant: Vince Souza PA-C  Surg Asst-1: Rossana Huffman    Anesthesia: General     Estimated Blood Loss (mL): 653     Complications: None    Specimens: * No specimens in log *     Implants:   Implant Name Type Inv.  Item Serial No.  Lot No. LRB No. Used Action   CEMENT BNE 40GM FULL DOSE PMMA W/ GENT HI VISC RADPQ LNG - SNA  CEMENT BNE 40GM FULL DOSE PMMA W/ GENT HI VISC RADPQ LNG NA [facility] [employer] [facility] [id number] Left 2 Implanted   COMPONENT FEM SZ 6 L KNEE POST STBL AMOS ATTUNE - SNA  COMPONENT FEM SZ 6 L KNEE POST STBL AMOS ATTUNE NA [facility] [employer] [facility] [id number] Left 1 Implanted   STEM FEM [id number] AMOS ATTUNE - SNA  STEM FEM [id number] AMOS ATTUNE NA [facility] [employer] [facility] [id number] Left 1 Implanted   BASEPLATE TIB SZ 5 FIX BEAR CO CHROM MOLYBDENUM TI ALLY END - SNA  BASEPLATE TIB SZ 5 FIX BEAR CO CHROM MOLYBDENUM TI ALLY END NA [employer] [facility] [id number] Left 1 Implanted   COMPONENT PAT [id number] KNEE POLY DOME AMOS MEDIALIZED ATTUNE - SNA  COMPONENT PAT [id number] KNEE POLY DOME AMOS MEDIALIZED ATTUNE NA [employer] [facility] [id number] Left 1 Implanted   INSERT TIB SZ 6 THK7MM KNEE POST STBL FIX BEAR ATTUNE - SNA  INSERT TIB SZ 6 THK7MM KNEE POST STBL FIX BEAR ATTUNE NA [employer] [facility] [id number] Left 1 Implanted       Drains: * No LDAs found *    Findings: DJD left knee    Electronically Signed by Aurelia Rodriguez PA-C on 3/31/2021 at 12:09 PM

## 2021-04-01 VITALS
OXYGEN SATURATION: 99 % | DIASTOLIC BLOOD PRESSURE: 68 MMHG | TEMPERATURE: 98.3 F | HEIGHT: 65 IN | SYSTOLIC BLOOD PRESSURE: 142 MMHG | RESPIRATION RATE: 14 BRPM | BODY MASS INDEX: 33.66 KG/M2 | WEIGHT: 202 LBS | HEART RATE: 88 BPM

## 2021-04-01 LAB
ANION GAP SERPL CALC-SCNC: 6 MMOL/L (ref 5–15)
BUN SERPL-MCNC: 11 MG/DL (ref 6–20)
BUN/CREAT SERPL: 16 (ref 12–20)
CALCIUM SERPL-MCNC: 8.1 MG/DL (ref 8.5–10.1)
CHLORIDE SERPL-SCNC: 103 MMOL/L (ref 97–108)
CO2 SERPL-SCNC: 25 MMOL/L (ref 21–32)
CREAT SERPL-MCNC: 0.7 MG/DL (ref 0.55–1.02)
GLUCOSE SERPL-MCNC: 107 MG/DL (ref 65–100)
HGB BLD-MCNC: 12.1 G/DL (ref 11.5–16)
INR PPP: 1 (ref 0.9–1.1)
POTASSIUM SERPL-SCNC: 3.4 MMOL/L (ref 3.5–5.1)
PROTHROMBIN TIME: 10.9 SEC (ref 9–11.1)
SODIUM SERPL-SCNC: 134 MMOL/L (ref 136–145)

## 2021-04-01 PROCEDURE — 85610 PROTHROMBIN TIME: CPT

## 2021-04-01 PROCEDURE — 85018 HEMOGLOBIN: CPT

## 2021-04-01 PROCEDURE — 74011250637 HC RX REV CODE- 250/637: Performed by: PHYSICIAN ASSISTANT

## 2021-04-01 PROCEDURE — 0SRD0J9 REPLACEMENT OF LEFT KNEE JOINT WITH SYNTHETIC SUBSTITUTE, CEMENTED, OPEN APPROACH: ICD-10-PCS | Performed by: ORTHOPAEDIC SURGERY

## 2021-04-01 PROCEDURE — 97110 THERAPEUTIC EXERCISES: CPT

## 2021-04-01 PROCEDURE — 80048 BASIC METABOLIC PNL TOTAL CA: CPT

## 2021-04-01 PROCEDURE — 97116 GAIT TRAINING THERAPY: CPT

## 2021-04-01 PROCEDURE — 36415 COLL VENOUS BLD VENIPUNCTURE: CPT

## 2021-04-01 PROCEDURE — 97530 THERAPEUTIC ACTIVITIES: CPT

## 2021-04-01 RX ORDER — OXYCODONE HYDROCHLORIDE 5 MG/1
5 TABLET ORAL
Qty: 40 TAB | Refills: 0 | Status: SHIPPED | OUTPATIENT
Start: 2021-04-01 | End: 2021-04-08

## 2021-04-01 RX ORDER — ASPIRIN 325 MG
325 TABLET ORAL 2 TIMES DAILY
Qty: 60 TAB | Refills: 0 | Status: SHIPPED | OUTPATIENT
Start: 2021-04-01 | End: 2021-05-01

## 2021-04-01 RX ADMIN — PANTOPRAZOLE SODIUM 20 MG: 20 TABLET, DELAYED RELEASE ORAL at 07:20

## 2021-04-01 RX ADMIN — POLYETHYLENE GLYCOL 3350 17 G: 17 POWDER, FOR SOLUTION ORAL at 08:32

## 2021-04-01 RX ADMIN — DOCUSATE SODIUM 50 MG AND SENNOSIDES 8.6 MG 1 TABLET: 8.6; 5 TABLET, FILM COATED ORAL at 08:32

## 2021-04-01 RX ADMIN — ACETAMINOPHEN 650 MG: 325 TABLET ORAL at 02:57

## 2021-04-01 RX ADMIN — OXYCODONE 5 MG: 5 TABLET ORAL at 05:38

## 2021-04-01 RX ADMIN — ASPIRIN 325 MG ORAL TABLET 325 MG: 325 PILL ORAL at 08:41

## 2021-04-01 RX ADMIN — Medication 10 ML: at 06:00

## 2021-04-01 RX ADMIN — ACETAMINOPHEN 650 MG: 325 TABLET ORAL at 08:33

## 2021-04-01 RX ADMIN — HYDROCHLOROTHIAZIDE: 25 TABLET ORAL at 08:32

## 2021-04-01 NOTE — PROGRESS NOTES
Problem: Mobility Impaired (Adult and Pediatric)  Goal: *Acute Goals and Plan of Care (Insert Text)  Description: FUNCTIONAL STATUS PRIOR TO ADMISSION: Patient was independent without use of DME - performed stairs - step too. HOME SUPPORT PRIOR TO ADMISSION: The patient lived with sisters but did not require assist.    Physical Therapy Goals  Initiated 3/31/2021    1. Patient will move from supine to sit and sit to supine , scoot up and down, and roll side to side in bed with modified independence within 4 days. 2. Patient will perform sit to stand with modified independence within 4 days. 3. Patient will ambulate with modified independence for > 150 feet with the least restrictive device within 4 days. 4. Patient will ascend/descend 4 stairs with one handrail(s) with modified independence within 4 days. 5. Patient will perform home exercise program per protocol with modified independence within 4 days. 6. Patient will demonstrate AROM 0-90 degrees in operative joint within 4 days. PHYSICAL THERAPY EVALUATION  Patient: Gosia Khan (12 y.o. female)  Date: 3/31/2021  Primary Diagnosis: Primary localized osteoarthritis of left knee [M17.12]  Procedure(s) (LRB):  LEFT TOTAL KNEE ARTHROPLASTY (Left) Day of Surgery   Precautions: WBAT       ASSESSMENT  Based on the objective data described below, the patient presents POD# 0 left TKA with left knee pain, nausea, decreased AROM/strength and function left leg, decreased activity tolerance, decline in functional mobility and impaired standing balance/gait. Pt despite c/o nausea was able to progress OOB - amb in hallway with stable VS.  Anticipate pt will be cleared from PT standpoint for discharge after 1 - 2 sessions. Current Level of Function Impacting Discharge (mobility/balance): Min assist for bed mobility, CGA for transfers/ambulation    Functional Outcome Measure:   The patient scored 55/100 on the Barthel Index outcome measure which is indicative of moderate disability for ADL's and functional mobility. Other factors to consider for discharge: supportive sisters     Patient will benefit from skilled therapy intervention to address the above noted impairments. PLAN :  Recommendations and Planned Interventions: bed mobility training, transfer training, gait training, therapeutic exercises, patient and family training/education, and therapeutic activities      Frequency/Duration: Patient will be followed by physical therapy:  twice daily to address goals. Recommendation for discharge: (in order for the patient to meet his/her long term goals)  Physical therapy at least 2 days/week in the home     This discharge recommendation:  Has been made in collaboration with the attending provider and/or case management    IF patient discharges home will need the following DME: patient owns DME required for discharge         SUBJECTIVE:   Patient stated the pain is more in the back of my knee.     OBJECTIVE DATA SUMMARY:   HISTORY:    Past Medical History:   Diagnosis Date    Asthma     Cancer ([facility] [address] 2016/2017     [facility] DR. RAPHAEL     GERD (gastroesophageal reflux disease)     History of bronchitis     Hypertension     PUD (peptic ulcer disease)     Vertigo      Past Surgical History:   Procedure Laterality Date    HX COLONOSCOPY      HX ENDOSCOPY      HX GYN  1975    OVARIAN CYST     HX HEART CATHETERIZATION      DR. Pedraza [facility] -CARDIOLOGIST AT [facility]    HX ORTHOPAEDIC Left 2012    LEFT KNEE SURGERY - TORN MENISCUS REPAIR    NEUROLOGICAL PROCEDURE UNLISTED      LUMBAR LAMINECTOMY L4 - DR. ALANIZ (NEURO) HERNIATED DISC REMOVED       Personal factors and/or comorbidities impacting plan of care: as above    Home Situation  Home Environment: Private residence  # Steps to Enter: 3  Rails to Enter: Yes  Hand Rails : Bilateral(can reach both at same time)  Wheelchair Ramp: No  One/Two Story Residence: One story  Living Alone: No  Support Systems: Family member(s)(sisters)  Patient Expects to be Discharged to[de-identified] Private residence  Current DME Used/Available at Home: [address], [address], straight, Shower chair, Raised toilet seat, Walker, rolling, Grab bars  Tub or Shower Type: Tub/Shower combination    EXAMINATION/PRESENTATION/DECISION MAKING:   Critical Behavior:  Neurologic State: Alert  Orientation Level: Oriented X4  Cognition: Follows commands  Safety/Judgement: Awareness of environment, Good awareness of safety precautions  Range Of Motion:  AROM: Within functional limits                       Strength:    Strength: Within functional limits                    Tone & Sensation:   Tone: Normal              Sensation: Intact               Coordination:  Coordination: Within functional limits  Functional Mobility:  Bed Mobility:     Supine to Sit: Minimum assistance  Sit to Supine: Minimum assistance  Scooting: Contact guard assistance; Additional time  Transfers:  Sit to Stand: Contact guard assistance  Stand to Sit: Contact guard assistance                       Balance:   Sitting: Intact; Without support  Standing: Impaired; Without support  Standing - Static: Good;Constant support  Standing - Dynamic : Good;Constant support  Ambulation/Gait Training:  Distance (ft): 40 Feet (ft)  Assistive Device: Walker, rolling;Gait belt  Ambulation - Level of Assistance: Contact guard assistance        Gait Abnormalities: Antalgic;Decreased step clearance; Step to gait  Right Side Weight Bearing: Full  Left Side Weight Bearing: As tolerated  Base of Support: Center of gravity altered;Shift to right  Stance: Left decreased  Speed/Beatriz: Slow  Step Length: Right shortened;Left shortened  Swing Pattern: Left asymmetrical              Therapeutic Exercises:   Pt performed ankle pumps, quad sets and heel slides. Patient also demonstrated proper use of incentive spirometer. Pt instructed to perform all x 10 once hr when awake.     Functional Measure:  Barthel Index:    Bathing: 0  Bladder: 10  Bowels: 10  Groomin  Dressin  Feeding: 10  Mobility: 0  Stairs: 0  Toilet Use: 5  Transfer (Bed to Chair and Back): 10  Total: 55/100       The Barthel ADL Index: Guidelines  1. The index should be used as a record of what a patient does, not as a record of what a patient could do. 2. The main aim is to establish degree of independence from any help, physical or verbal, however minor and for whatever reason. 3. The need for supervision renders the patient not independent. 4. A patient's performance should be established using the best available evidence. Asking the patient, friends/relatives and nurses are the usual sources, but direct observation and common sense are also important. However direct testing is not needed. 5. Usually the patient's performance over the preceding 24-48 hours is important, but occasionally longer periods will be relevant. 6. Middle categories imply that the patient supplies over 50 per cent of the effort. 7. Use of aids to be independent is allowed. Ness Bear., Barthel, D.W. (0198). Functional evaluation: the Barthel Index. [facility] (14)2. Edgardo Chisholm, CRAIGF, [place] Powers., Rowan Marin., [place], [address] [place] [address] Ave (). Measuring the change indisability after inpatient rehabilitation; comparison of the responsiveness of the Barthel Index and Functional [place] Measure. Journal of Neurology, Neurosurgery, and Psychiatry, 66(4), 338-897. Edward Turner, N.J.A, OSCAR Carroll.NANNETTE, & Andrew Adrian MOmkarA. (2004.) Assessment of post-stroke quality of life in cost-effectiveness studies: The usefulness of the Barthel Index and the EuroQoL-5D.  Quality of Life Research, 15, 657-80           Physical Therapy Evaluation Charge Determination   History Examination Presentation Decision-Making   LOW Complexity : Zero comorbidities / personal factors that will impact the outcome / POC LOW Complexity : 1-2 Standardized tests and measures addressing body structure, function, activity limitation and / or participation in recreation  LOW Complexity : Stable, uncomplicated  LOW Complexity : FOTO score of       Based on the above components, the patient evaluation is determined to be of the following complexity level: LOW     Pain Rating:  Left knee 4/10    Activity Tolerance:   Fair - VSS - c/o nausea     After treatment patient left in no apparent distress:   Supine in bed, Call bell within reach, and Caregiver / family present    COMMUNICATION/EDUCATION:   The patients plan of care was discussed with: Registered nurse. Fall prevention education was provided and the patient/caregiver indicated understanding., Patient/family have participated as able in goal setting and plan of care. , and Patient/family agree to work toward stated goals and plan of care.     Thank you for this referral.  Britta Bettencourt, PT   Time Calculation: 40 mins

## 2021-04-01 NOTE — PROGRESS NOTES
TOTAL KNEE PROGRESS NOTE      Subjective:     Post-Operative Day: 1 Status Post left Total Knee Arthroplasty  Systemic or Specific Complaints:No Complaints    Objective:     Patient Vitals for the past 24 hrs:   BP Temp Pulse Resp SpO2   04/01/21 1302 (!) 142/68 98.3 °F (36.8 °C) 88 14 99 %   04/01/21 0824 (!) 187/78 98.3 °F (36.8 °C) 81 14 99 %   04/01/21 0307 (!) 150/80 98 °F (36.7 °C) 81 14 99 %   03/31/21 2359 (!) 161/77 98.3 °F (36.8 °C) 81 16 98 %   03/31/21 1856 (!) 170/75  98 16 96 %   03/31/21 1738 (!) 188/79 97.8 °F (36.6 °C) 98     03/31/21 1648 (!) 175/81  (!) 102     03/31/21 1645 (!) 180/79  93     03/31/21 1638 (!) 171/80  93     03/31/21 1546 (!) 165/75 97.5 °F (36.4 °C) 95 16 93 %   03/31/21 1507 (!) 164/71 97.4 °F (36.3 °C) 95 16 94 %       General: alert, cooperative, no distress, appears stated age   Wound: Wound clean and dry no evidence of infection. , No Erythema, No Edema, No Drainage and Wound Intact   Motion: Extension: Full Extension   DVT Exam: No evidence of DVT seen on physical exam.  Negative Cindy's sign. No cords or calf tenderness. No significant calf/ankle edema.      Data Review:    Recent Results (from the past 24 hour(s))   METABOLIC PANEL, BASIC    Collection Time: 04/01/21  3:10 AM   Result Value Ref Range    Sodium 134 (L) 136 - 145 mmol/L    Potassium 3.4 (L) 3.5 - 5.1 mmol/L    Chloride 103 97 - 108 mmol/L    CO2 25 21 - 32 mmol/L    Anion gap 6 5 - 15 mmol/L    Glucose 107 (H) 65 - 100 mg/dL    BUN 11 6 - 20 MG/DL    Creatinine 0.70 0.55 - 1.02 MG/DL    BUN/Creatinine ratio 16 12 - 20      GFR est AA >60 >60 ml/min/1.73m2    GFR est non-AA >60 >60 ml/min/1.73m2    Calcium 8.1 (L) 8.5 - 10.1 MG/DL   HEMOGLOBIN    Collection Time: 04/01/21  3:10 AM   Result Value Ref Range    HGB 12.1 11.5 - 16.0 g/dL   PROTHROMBIN TIME + INR    Collection Time: 04/01/21  3:10 AM   Result Value Ref Range    INR 1.0 0.9 - 1.1      Prothrombin time 10.9 9.0 - 11.1 sec Assessment:     Status Post left Total Knee Arthroplasty. Doing well postoperatively. . Bandage clean/dry. Labs stable. PT progressing well, cleared for discharge. Pain control WNL.      Plan:     Continues current post-op course  Continue PT per protocol  Plan to discharge to [facility]. today

## 2021-04-01 NOTE — PROGRESS NOTES
Problem: Mobility Impaired (Adult and Pediatric)  Goal: *Acute Goals and Plan of Care (Insert Text)  Description: FUNCTIONAL STATUS PRIOR TO ADMISSION: Patient was independent without use of DME - performed stairs - step too. HOME SUPPORT PRIOR TO ADMISSION: The patient lived with sisters but did not require assist.    Physical Therapy Goals  Initiated 3/31/2021    1. Patient will move from supine to sit and sit to supine , scoot up and down, and roll side to side in bed with modified independence within 4 days. 2. Patient will perform sit to stand with modified independence within 4 days. 3. Patient will ambulate with modified independence for > 150 feet with the least restrictive device within 4 days. 4. Patient will ascend/descend 4 stairs with one handrail(s) with modified independence within 4 days. 5. Patient will perform home exercise program per protocol with modified independence within 4 days. 6. Patient will demonstrate AROM 0-90 degrees in operative joint within 4 days. Outcome: Progressing Towards Goal   PHYSICAL THERAPY TREATMENT  Patient: Smith Gill (18 y.o. female)  Date: 4/1/2021  Diagnosis: Primary localized osteoarthritis of left knee [M17.12] Primary localized osteoarthritis of left knee  Procedure(s) (LRB):  LEFT TOTAL KNEE ARTHROPLASTY (Left) 1 Day Post-Op  Precautions:    Chart, physical therapy assessment, plan of care and goals were reviewed. ASSESSMENT  Patient continues with skilled PT services and is progressing towards goals. Pt reports no difficulty over night - nausea resolved. Patient able to perform supine and seated TKR exercises with minimal cues. Patient now safe and independent with amb with RW; sister present and will supervise on stairs. Pt cleared for discharge from PT standpoint. Current Level of Function Impacting Discharge (mobility/balance):  Mod indep with RW; supervision on stairs    Other factors to consider for discharge: pt lives with sister         PLAN :  Patient continues to benefit from skilled intervention to address the above impairments. Continue treatment per established plan of care. to address goals. Recommendation for discharge: (in order for the patient to meet his/her long term goals)  Physical therapy at least 2 days/week in the home     This discharge recommendation:  Has been made in collaboration with the attending provider and/or case management    IF patient discharges home will need the following DME: patient owns DME required for discharge       SUBJECTIVE:   Patient stated I think I'm doing much better today.     OBJECTIVE DATA SUMMARY:   Critical Behavior:  Neurologic State: Alert  Orientation Level: Oriented X4  Cognition: Follows commands  Safety/Judgement: Awareness of environment, Good awareness of safety precautions    Range of Motion:   Left Knee 90 flexion                         Functional Mobility Training:  Bed Mobility:     Supine to Sit: Stand-by assistance(to occ min assist to lift leg into bed)              Transfers:  Sit to Stand: Supervision  Stand to Sit: Supervision                             Balance:  Sitting: Intact; Without support  Standing: Impaired; With support  Standing - Static: Good;Constant support  Standing - Dynamic : Good;Constant support  Ambulation/Gait Training:  Distance (ft): 150 Feet (ft)  Assistive Device: Walker, rolling;Gait belt  Ambulation - Level of Assistance: Supervision        Gait Abnormalities: Antalgic;Decreased step clearance  Right Side Weight Bearing: Full  Left Side Weight Bearing: As tolerated  Base of Support: Center of gravity altered;Shift to right  Stance: Left decreased  Speed/Beatriz: Slow  Step Length: Right shortened;Left shortened  Swing Pattern: Left asymmetrical        Stairs:  Number of Stairs Trained: 4(using bilateral hand rails)  Stairs - Level of Assistance: Supervision        Therapeutic Exercises:     EXERCISE   Sets   Reps   Active Active Assist Passive Self ROM   Comments   Ankle Pumps  5 [x]                                        []                                        []                                        []                                           Quad Sets  5 [x]                                        []                                        []                                        []                                           Hamstring Sets  5 [x]                                        []                                        []                                        []                                           Short Arc Quads   []                                        []                                        []                                        []                                           Knee Extension Stretch     []                                          []                                          []                                          []                                           Heel Slides  5 [x]                                        []                                        []                                        []                                           Long Arc Quads  5 [x]                                        []                                        []                                        []                                           Knee Flexion Stretch  3 [x]                                        []                                        []                                        []                                           Straight Leg Raises   []                                        []                                        []                                        []                                               Pain Rating:  Left knee at rest 4/10; following amb 6/10    Activity Tolerance:   Good - up ad ally to bathroom, bedside chair with RW    After treatment patient left in no apparent distress: Sitting in chair, Call bell within reach, and ice applied left knee    COMMUNICATION/COLLABORATION:   The patients plan of care was discussed with: Registered nurse, Physician, and Case management.      Bubba Palacios, PT   Time Calculation: 40 mins

## 2021-04-01 NOTE — PROGRESS NOTES
SADIQ:  Patient plans to discharge home with [facility] with sister to transport when stable for discharge. RUR: 8%    Medicare pt has received, reviewed, and signed 1st IM letter informing them of their right to appeal the discharge. Signed copy has been placed on pt bedside chart. Care Management Interventions  PCP Verified by CM: Yes  Palliative Care Criteria Met (RRAT>21 & CHF Dx)?: No  Mode of Transport at Discharge: Other (see comment)  Transition of Care Consult (CM Consult): [address]: No  Reason Outside [place]: Physician referred to specific agency  MyChart Signup: No  Discharge Durable Medical Equipment: No  Health Maintenance Reviewed: Yes  Physical Therapy Consult: Yes  Occupational Therapy Consult: Yes  Speech Therapy Consult: No  Current Support Network: Lives Alone  Confirm Follow Up Transport: Family  The Plan for Transition of Care is Related to the Following Treatment Goals : Patient plans to discharge with [facility] and family to tranport. The Patient and/or Patient Representative was Provided with a Choice of Provider and Agrees with the Discharge Plan?: Yes  Freedom of Choice List was Provided with Basic Dialogue that Supports the Patient's Individualized Plan of Care/Goals, Treatment Preferences and Shares the Quality Data Associated with the Providers?: Yes  [place] Resource Information Provided?: No  Discharge Location  Discharge Placement: Home with home health     Reason for Admission:  Left Total Knee surgery                     RUR Score: 8%                    Plan for utilizing home health: The Plan for Transition of Care is related to the following treatment goals: [facility]    The Patient and/or patient representative Cayetano Brown was provided with a choice of provider and agrees   with the discharge plan.  [x] Yes [] No    Freedom of choice list was provided with basic dialogue that supports the patient's individualized plan of care/goals, treatment preferences and shares the quality data associated with the providers. [x] Yes [] No      PCP: First and Last name:  Russell Contreras MD,Phone: [phone]     Name of Practice:    Are you a current patient: Yes/No:  Yes   Approximate date of last visit:    Can you participate in a virtual visit with your PCP: Yes                  Current Advanced Directive/Advance Care Plan: Full Code      Healthcare Decision Maker:   Click here to complete [address] including selection of the Healthcare Decision Maker Relationship (ie \"Primary\")                             Transition of Care Plan:       CM spoke with patient in room 556. Patient is alert and oriented x4. Confirmed demographics. Patient is independent with ADL's/IADL's, lives alone but will have family to help at home. The patient owns her own walker and equipment, drives a vehicle [facility] [address] Patient plans to discharge home with [facility] and sister to transport her home. CM following for discharge needs.     Sherri Trent RN/CRM

## 2021-04-01 NOTE — DISCHARGE INSTRUCTIONS
DC Orders All Total Knees    Case Management for DC planning to Home HC .   - PT for 3 times a week for 1st week (4 PT visits to be scheduled), patient will transition to outpatient PT in 10 days post-operatively; WBAT. -  mg BID for 30 days post-operatively. - Remove AQUACEL bandage on the 7th day post-operatively (PLEASE reference discharge instructions INCISION CARE for additional information). - Remove staples at 2 weeks post-op; 4/14/21 .   - Follow up in Office in 2 weeks. After Hospital Care Plan:  Discharge Instructions Knee Replacement-Dr. Manolo Beck    Patient Name: Rocio Whitmore  Date of procedure: 3/31/2021   Procedure: Procedure(s):  LEFT TOTAL KNEE ARTHROPLASTY  Surgeon: Sushma Quintana) and Role:     Daisy Hughes MD - Primary    PCP: Rosario Berrios MD  Date of discharge: No discharge date for patient encounter. Follow up appointments   Follow up with Dr. Manolo Beck in 2 weeks. Call [phone] to make an appointment.  If home health has been arranged for you the agency will contact you to arrange dates/times for visits. Please call them if you do not hear from them within 24 hours after you are discharged    When to call your Orthopaedic Surgeon: Call [phone].  If you call after 5pm or on a weekend, the on call physician will be contacted   Unrelieved pain   Signs of infection-if your incision is red; continues to have drainage; drainage has a foul odor or if you have a persistent fever over 101 degrees   Signs of a blood clot in your leg-calf pain, tenderness, redness, swelling of lower leg    When to call your Primary Care Physician:   Concerns about medical conditions such as diabetes, high blood pressure, asthma, congestive heart failure   Call if blood sugars are elevated, persistent headache or dizziness, coughing or congestion, constipation or diarrhea, burning with urination, abnormal heart rate    When to call [id number] go to the nearest emergency room   Acute onset of chest pain, shortness of breath, difficulty breathing      Activity   Weight bearing as tolerated with walker or crutches. Refer to your handbook for instructions and pictures   Complete your Home Exercise Program daily as instructed by your therapist.  Refer to your handbook for instructions and pictures   Get up every one hour and walk (except at night when sleeping)   Do not drive or operate heavy machinery    Incision Care   The Aquacel (brown, waterproof) surgical dressing is to remain on your knee for 7 days. On the 7th day have someone gently peel the dressing off by carefully lifting the edge and stretching it slightly to break the adhesive seal   If the home health agency has not removed the Aquacel bandage by the 7th day please remove it yourself   You will have staples in your knee incision. They will be removed by the home health agency staff   If your Aquacel dressing comes loose/off before the 7th day, you may replace it with a dry sterile gauze dressing; change it daily. Once your incision is not draining, you may leave it open to air   You may take a shower with the Aquacel dressing in place. Once the Aquacel is removed, you may shower and get your incision wet but do not submerge your incision under water in a bath tub, hot tub or swimming pool for 6 weeks after surgery. Preventing blood clots    Take  mg BID for 30 days post-operatively.  Wear elastic stockings (TEDS) for 4 weeks.   You should remove them for approximately 1 hour daily for showering/sponge bathing    Pain management   Keep ice wrap in place except when walking; changing gel packs every 4 hours   Lie down and elevate your leg on pillows for about 30 minutes after walking to decrease swelling and pain    Do ankle pumps (10 repetitions) every hour while awake and get up frequently to walk    Take Tylenol 500mg every 6 hours for 2 weeks    Take narcotic pain pill as prescribed if needed. Take with food; avoid alcohol while taking pain medication. Decrease the amount of narcotic pain medication as your pain lessens     Diet   Resume usual diet; drink plenty of fluids; eat foods high in fiber   You may want to take a stool softener (such as Senokot-S or Colace) to prevent constipation while you are taking pain medication.   If constipation occurs, take a laxative (such as Dulcolax tablets, Milk of Magnesia, or a suppository)

## 2021-04-01 NOTE — OP NOTES
[address]   OPERATIVE REPORT    Name:  Teresa Hernandez  MR#:  [id number]  :  1950  ACCOUNT #:  [de-identified]  DATE OF SERVICE:  2021      PREOPERATIVE DIAGNOSIS:  Advanced degenerative arthritis of left knee with valgus deformity. POSTOPERATIVE DIAGNOSIS:  Advanced degenerative arthritis of left knee with valgus deformity. PROCEDURE PERFORMED:  Left total knee replacement. SURGEON:  Yolanda Barnes MD    ASSISTANT:  Melonie Archer PA-C    ANESTHESIA:  Regional block plus general.    COMPLICATIONS:  None. SPECIMENS REMOVED:  Tibial and femoral bone fragments. IMPLANTS:  Left total knee components as listed in operative report. ESTIMATED BLOOD LOSS:  200 mL. DRAINS:  None. INDICATIONS:  The patient has advanced degenerative arthritis of the left knee. She has failed conservative treatment. She has advanced degenerative changes, especially of the lateral compartment with a valgus deformity and now presents for the above procedure. PROCEDURE:  On the day of operation, the patient was taken to the holding area, regional block administered. The patient was taken to the operating room and placed in supine position. General anesthesia administered. Antibiotics were given. Consent confirmed. The left lower extremity was prepped and draped in the usual fashion. After exsanguination with an Esmarch, tourniquet inflated to 290 mmHg. A midline longitudinal incision was made over the knee, was carried through the subcutaneous tissue. A medial parapatellar capsular incision made. Advanced degenerative changes were noted throughout the knee. The knee was debrided of osteophytes and soft tissue. A drill was used to gain access to the femoral canal.  Distal femoral cutting guide assembled with a 5-degree valgus cut. The femur was sized and felt to be a #6 in the Attune system. Anterior and posterior cuts were made. Chamfer cuts were made.   Box cut for the posterior stabilized prosthesis made. External tibial alignment guide assembled. Tibial plateau cut made with an oscillating saw. The flexion and extension gaps were evaluated and felt to be appropriate. The bone quality was somewhat soft and it was felt that a supplemental stem would be appropriate. The tibia was sized and felt to be a #5 in the Attune system and prepared for a short supplemental stem. The patella was noted to be thin. It was prepared with an oscillating saw and sized for 35 mm. Trial components were put into place. 7-mm insert gave the best fit, range of motion, with proper alignment and proper tracking and the patella tracked reasonably well, although a limited lateral release provided proper tracking of the patella. Trial components were then removed. The knee was sterilely irrigated with pulse irrigation along with antibiotic solution. The components were then cemented into place using antibiotic-impregnated cement. 7-mm trial insert was put into place. Soft tissues infiltrated with Exparel local anesthetic. After the cement matured, the 7-mm insert was put into place and felt to be stable. Tourniquet released. Coagulation achieved with electrocautery. Capsule repaired with a combination of #2 and #1 Vicryl, supplemented with #2 PDS, 2-0 Vicryl was used to close the subcutaneous tissue and staples were used to close the skin. Sterile dressings were applied. The patient was taken to recovery room in satisfactory condition. The assistant, Freeman Nyhan, PA-C, assisted me with positioning, retraction, implant installation, and incision closure.         Haydee Chaves MD      [id number]  D:  04/01/2021 10:32  T:  04/01/2021 16:00  JOB #:  [id number]

## 2021-04-01 NOTE — PROGRESS NOTES
Chart reviewed and cleared for PT session. Left leg ace wraps removed with increased ease for AROM and bed mobility. Patient safe and independent with amb with RW and performed stairs with use of bilateral rails and supervision only. Pt is cleared for discharge from PT standpoint - nursing notified.     Full note to follow -  Susanne Yanes, PT

## 2021-04-05 ENCOUNTER — PATIENT OUTREACH (OUTPATIENT)
Dept: CASE MANAGEMENT | Age: 71
End: 2021-04-05

## 2021-04-05 NOTE — PROGRESS NOTES
This note will not be viewable in [address] Post Discharge Follow-up contact after Joint Replacement    Patient discharged on 4/1/21  By  Rachell Hebert   following  left knee Arthroplasty. Spoke with patient today, who reports that        \" my therapist is very pleased with my progress. \"  Denies Fever, Shortness of Breath or Chest Pain. Home Health has visited. Patient also reports:  Incision  clean, dry, intact  Calf is non-tender, operative extremity has moderate swelling. Pain is well managed. Discussed use of ice & elevation. Patient is progressing with therapy and is exercising independently. Taking Aspirin for anticoagulation, oxycodone for pain. Patient is experiencing symptoms of constipation. No BM since surgery. She is taking pericolace BID and tolerating solid food and fluids. Denies bloating or discomfort. Discussed the addition of a fiber product and reducing oxycodone. She is urinating without difficulty. Discussed side effects of anticoagulants & pain medications (bleeding/bruising, constipation, lightheaded/dizziness)  Follow up appointment is scheduled on 4/12. Discussed calling surgeon Dr Chari Layne  for drainage, bleeding, swelling in operative extremity, fever or pain. Discussed calling PCP Dr Elia Traore with other medical issues.

## 2021-04-05 NOTE — NURSE NAVIGATOR
[address]  [facility] Bundle Handoff     FROM:                                TO: [address]                                                      ([address] [place] [address] or Facility name)  Deandre Gruber [address]  [address] [place] [address]  Dept: [address]: [phone]                                      Room#:  556/01                                                       Nurse Navigator:  Nandini Vail RN         SITUATION      ASAScore: ASA 2 - Patient with mild systemic disease with no functional limitations    Admitted:  3/31/2021  Hospital Day: 2      Attending Provider:  No att. providers found     Consultations:  None    PCP:  Judi Alba MD   [phone]     Admitting Dx:  Primary localized osteoarthritis of left knee [M17.12]       Principal Problem:    Primary localized osteoarthritis of left knee (3/26/2021)      5 Days Post-Op of   Procedure(s):  LEFT TOTAL KNEE ARTHROPLASTY   BY: Dread You MD             ON: 3/31/2021                  Code Status: Prior             Advance Directive? Not Received (Send w/patient)     Isolation:  There are currently no Active Isolations       MDRO: No current active infections    BACKGROUND     Allergies: Allergies   Allergen Reactions    Mold Cough     AND NOSE (NASAL PASSAGEWAYS -BURNS )    Penicillin G Rash    Sulfa (Sulfonamide Antibiotics) Rash       Past Medical History:   Diagnosis Date    Asthma     Cancer ([facility] [address] 2016/2017     [facility] AND [facility] RAPHAEL     GERD (gastroesophageal reflux disease)     History of bronchitis     Hypertension     PUD (peptic ulcer disease)     Vertigo        Past Surgical History:   Procedure Laterality Date    HX COLONOSCOPY      HX ENDOSCOPY      HX GYN  1975    OVARIAN CYST     HX HEART CATHETERIZATION      DR. Latasha Estrella -CARDIOLOGIST AT [facility]    HX ORTHOPAEDIC Left 2012    LEFT KNEE SURGERY - TORN MENISCUS REPAIR    NEUROLOGICAL PROCEDURE UNLISTED      LUMBAR LAMINECTOMY L4 - DR. ALANIZ (NEURO) HERNIATED DISC REMOVED       Prior to Admission Medications   Prescriptions Last Dose Informant Patient Reported? Taking? OTHER   Yes No   Si Puffs every six (6) hours. LEVALBUTEROL TARTRATE INHALER  (200 PUFFS)   Omeprazole delayed release (PRILOSEC D/R) 20 mg tablet 3/31/2021 at 0500  Yes No   Sig: Take 20 mg by mouth daily. acetaminophen (Tylenol Arthritis Pain) 650 mg TbER 3/24/2021  Yes No   Sig: Take 650 mg by mouth every eight (8) hours. acetaminophen (Tylenol Extra Strength) 500 mg tablet 3/24/2021 at Unknown time  Yes Yes   Sig: Take 1,000 mg by mouth every six (6) hours as needed for Pain. fluticasone propionate (Flonase Allergy Relief) 50 mcg/actuation nasal spray 3/30/2021 at Unknown time  Yes Yes   Si Sprays by Both Nostrils route as needed. As needed   guaiFENesin ER (Mucinex) 600 mg ER tablet   Yes No   Sig: Take 600 mg by mouth as needed for Congestion. hydroCHLOROthiazide 25 mg tab 25 mg, lisinopriL 20 mg tab 20 mg 3/30/2021 at Unknown time  Yes Yes   Sig: Take 1 Dose by mouth daily. PATIENT TAKES 1/2 PILL EVERY MORNING   lidocaine HCL (XYLOCAINE) 3 % topical cream   Yes No   Sig: Apply  to affected area two (2) times a day. meclizine (ANTIVERT) 25 mg tablet 3/24/2021  Yes No   Sig: Take 25 mg by mouth three (3) times daily as needed for Dizziness. Indications: sensation of spinning or whirling   metroNIDAZOLE (METROCREAM) 0.75 % topical cream   Yes No   Sig: Apply 0.75 Tubes to affected area two (2) times a day. Use a thin layer to affected areas after washing   multivitamin (ONE A DAY) tablet 3/24/2021  Yes No   Sig: Take 1 Tab by mouth daily. Facility-Administered Medications: None       Vaccinations: There is no immunization history on file for this patient. ASSESSMENT   Age: 66 y.o.              Gender: female        Height: Height: 5' 5\" (165.1 cm) Weight:Weight: 91.6 kg (202 lb)     No data found. Active Orders   There are no active orders of the following types: Diet. Orientation: Orientation Level: Oriented X4    Active Lines/Drains:  (Peg Tube / Mortensen / CL or S/L?):no    Urinary Status: Has not voided      Last BM:       Skin Integrity: Incision (comment)             Mobility: Slightly limited   Weight Bearing Status: WBAT (Weight Bearing as Tolerated)      Gait Training  Assistive Device: Walker, rolling, Gait belt  Ambulation - Level of Assistance: Supervision  Distance (ft): 150 Feet (ft)  Stairs - Level of Assistance: Supervision  Number of Stairs Trained: 4(using bilateral hand rails)     On Anticoagulation? YES  Aspirin                                         Pain Medications given:  Oxycodone                                   Lab Results   Component Value Date/Time    Glucose 107 (H) 04/01/2021 03:10 AM    Hemoglobin A1c 5.6 03/23/2021 11:43 AM    INR 1.0 04/01/2021 03:10 AM    INR 1.0 03/23/2021 11:43 AM    HGB 12.1 04/01/2021 03:10 AM    HGB 13.5 03/23/2021 11:43 AM       Readmission Risks:  Score:         RECOMMENDATION     See After Visit Summary (AVS) for:  · Discharge instructions  · After [address]   · Medication Reconciliation          [facility] Orthopaedic Nurse Navigator  Beryle Blinks, BSN, RN-BC       Office  [phone]  Cell      [phone]  Fax      [phone]  [email or web address]             . Hernandez

## 2021-04-12 NOTE — DISCHARGE SUMMARY
Discharge Summary    Physician: Ray Hurley MD    Physician Assistant: Roxi Mancilla PA-C    Admit Diagnosis:  Primary localized osteoarthritis of left knee [M17.12]    Final Diagnosis:   1. Advanced degenerative arthritis of left knee . Procedure: Left total knee replacement    Complications: None. History of Present Illness: The patient has a long-standing history of pain within the left knee . The patient has severe degenerative arthritis of the left knee and has exhausted conservative therapy at this time including prior intra-articular injections, activity modifications, OTC NSAIDS. The patient has been limited in their ability to perform ADLs, walk short distances or climb steps appropriately. The patient presents for the above-mentioned procedure. The patient has been cleared from a medical and cardiac standpoint. Past Medical History:  Recorded in the chart. Physical Examination: Also recorded in the chart. The patient is noted for ambulating with an antalgic gait favoring the left side. Examination of the left knee reveals pain to palpation medial joint line. X-rays reveal severe DJD left knee. Course in the Hospital:  The patient was admitted to the hospital.  The Pt. Underwent a left total knee replacement . Postoperatively, the pt. did well. The pt. Remained stable from a medical standpoint. The patient ambulated, WBAT weightbearing within the hospital with Physical Therapy. The patient continued with this therapy at [facility] with PT. The patient began taking  mg BID post-operatively for DVT prophylaxis and will continue on this for 30 days. At the time of discharge, there was no evidence of any DVT noted. The patient had negative Homans signs bilateral lower extremities. At the time of discharge, the patient's left knee incision appeared with staples intact. No signs of infection or inflammation noted.  The patient will follow up in our office in 2-1/2 weeks.    DC med list:  Discharge Medication List as of 4/1/2021  3:10 PM      START taking these medications    Details   oxyCODONE IR (ROXICODONE) 5 mg immediate release tablet Take 1 Tab by mouth every four (4) hours as needed for Pain for up to 7 days. Max Daily Amount: 30 mg., Normal, Disp-40 Tab, R-0      aspirin (ASPIRIN) 325 mg tablet Take 1 Tab by mouth two (2) times a day for 30 days. , Normal, Disp-60 Tab, R-0         CONTINUE these medications which have NOT CHANGED    Details   hydroCHLOROthiazide 25 mg tab 25 mg, lisinopriL 20 mg tab 20 mg Take 1 Dose by mouth daily. PATIENT TAKES 1/2 PILL EVERY MORNING, Historical Med      acetaminophen (Tylenol Extra Strength) 500 mg tablet Take 1,000 mg by mouth every six (6) hours as needed for Pain., Historical Med      fluticasone propionate (Flonase Allergy Relief) 50 mcg/actuation nasal spray 2 Sprays by Both Nostrils route as needed. As needed, Historical Med      Omeprazole delayed release (PRILOSEC D/R) 20 mg tablet Take 20 mg by mouth daily. , Historical Med      metroNIDAZOLE (METROCREAM) 0.75 % topical cream Apply 0.75 Tubes to affected area two (2) times a day. Use a thin layer to affected areas after washing, Historical Med      acetaminophen (Tylenol Arthritis Pain) 650 mg TbER Take 650 mg by mouth every eight (8) hours. , Historical Med      lidocaine HCL (XYLOCAINE) 3 % topical cream Apply  to affected area two (2) times a day., Historical Med      multivitamin (ONE A DAY) tablet Take 1 Tab by mouth daily. , Historical Med      OTHER 2 Puffs every six (6) hours. LEVALBUTEROL TARTRATE INHALER  (200 PUFFS), Historical Med      meclizine (ANTIVERT) 25 mg tablet Take 25 mg by mouth three (3) times daily as needed for Dizziness. Indications: sensation of spinning or whirling, Historical Med      guaiFENesin ER (Mucinex) 600 mg ER tablet Take 600 mg by mouth as needed for Congestion. , Historical Med             Patient Disposition: Home  Followup Care:  Discharge Condition: good  PT/OT per Home Health  Regular Diet  As directed    Follow-up Information     Follow up With Specialties Details Why Contact Info    Soren Sauceda MD Internal Medicine, [facility]   [address] [place] [id number]  [phone]      0 [address]                  Loretta Sarabia PA-C

## 2022-03-19 PROBLEM — M17.12 PRIMARY LOCALIZED OSTEOARTHRITIS OF LEFT KNEE: Status: ACTIVE | Noted: 2021-03-26

## 2023-05-15 RX ORDER — LIDOCAINE HYDROCHLORIDE 30 MG/G
CREAM TOPICAL 2 TIMES DAILY
COMMUNITY

## 2023-05-15 RX ORDER — SENNOSIDES 8.6 MG
650 CAPSULE ORAL EVERY 8 HOURS
COMMUNITY

## 2023-05-15 RX ORDER — FLUTICASONE PROPIONATE 50 MCG
2 SPRAY, SUSPENSION (ML) NASAL PRN
COMMUNITY

## 2023-05-15 RX ORDER — GUAIFENESIN 600 MG/1
600 TABLET, EXTENDED RELEASE ORAL PRN
COMMUNITY

## 2023-05-15 RX ORDER — OMEPRAZOLE 20 MG/1
20 TABLET, DELAYED RELEASE ORAL DAILY
COMMUNITY

## 2023-05-15 RX ORDER — ACETAMINOPHEN 500 MG
1000 TABLET ORAL EVERY 6 HOURS PRN
COMMUNITY

## 2023-05-15 RX ORDER — MECLIZINE HYDROCHLORIDE 25 MG/1
25 TABLET ORAL 3 TIMES DAILY PRN
COMMUNITY

## 2024-08-05 ENCOUNTER — OFFICE VISIT (OUTPATIENT)
Age: 74
End: 2024-08-05
Payer: MEDICARE

## 2024-08-05 VITALS
DIASTOLIC BLOOD PRESSURE: 80 MMHG | WEIGHT: 156 LBS | HEART RATE: 80 BPM | OXYGEN SATURATION: 97 % | RESPIRATION RATE: 18 BRPM | BODY MASS INDEX: 25.96 KG/M2 | SYSTOLIC BLOOD PRESSURE: 200 MMHG

## 2024-08-05 DIAGNOSIS — G89.29 CHRONIC LEFT-SIDED LOW BACK PAIN WITH LEFT-SIDED SCIATICA: Primary | ICD-10-CM

## 2024-08-05 DIAGNOSIS — M54.42 CHRONIC LEFT-SIDED LOW BACK PAIN WITH LEFT-SIDED SCIATICA: Primary | ICD-10-CM

## 2024-08-05 PROCEDURE — 99204 OFFICE O/P NEW MOD 45 MIN: CPT | Performed by: PSYCHIATRY & NEUROLOGY

## 2024-08-05 PROCEDURE — 1123F ACP DISCUSS/DSCN MKR DOCD: CPT | Performed by: PSYCHIATRY & NEUROLOGY

## 2024-08-05 RX ORDER — ALBUTEROL SULFATE 90 UG/1
1 AEROSOL, METERED RESPIRATORY (INHALATION) EVERY 4 HOURS PRN
COMMUNITY
Start: 2024-02-13 | End: 2024-08-11

## 2024-08-05 RX ORDER — GABAPENTIN 100 MG/1
100 CAPSULE ORAL NIGHTLY
COMMUNITY

## 2024-08-05 RX ORDER — LISINOPRIL AND HYDROCHLOROTHIAZIDE 25; 20 MG/1; MG/1
1 TABLET ORAL DAILY
COMMUNITY

## 2024-08-05 ASSESSMENT — PATIENT HEALTH QUESTIONNAIRE - PHQ9
SUM OF ALL RESPONSES TO PHQ QUESTIONS 1-9: 0
SUM OF ALL RESPONSES TO PHQ9 QUESTIONS 1 & 2: 0
SUM OF ALL RESPONSES TO PHQ QUESTIONS 1-9: 0
1. LITTLE INTEREST OR PLEASURE IN DOING THINGS: NOT AT ALL
SUM OF ALL RESPONSES TO PHQ QUESTIONS 1-9: 0
SUM OF ALL RESPONSES TO PHQ QUESTIONS 1-9: 0
2. FEELING DOWN, DEPRESSED OR HOPELESS: NOT AT ALL

## 2024-08-05 NOTE — PROGRESS NOTES
Double checked patients BP again twice. It was high both times. I instructed the patient to call her primary care and let them know and see what needs to be done to get it under control.

## 2024-08-05 NOTE — PROGRESS NOTES
NEUROLOGY NEW PATIENT CONSULTATION      8/5/2024    RE: Diana To    REFERRED BY:  Chuy House MD    CHIEF COMPLAINT:  This is Diana To is a 74 y.o. female  who had concerns including New Patient (Patient was referred for Siactia nerve but she doesn't think that isn't it. She has stabbing pain in the shin. ).    HPI:     For the past 1 yr, patient had a cyst on the L leg with L leg swelling. Saw a vascular surgeon who said she did not have blood clots.    Currently has ice pick pain on the L lateral leg. Will have good and bad days. Taking Gabapentin at night c/o PCP.     (-) weakness    (+) L4L5 back surgery c/o Neurosurgeon 2016  with chronic L lower back pain radiating down the L leg.     (+) L  knee replacement        Review of Systems  (-) fever  (-) rash  All other systems reviewed and are negative    PMH  Past Medical History:   Diagnosis Date    Asthma     Cancer (HCC) 2016/2017     [facility]USTILL[facility]     GERD (gastroesophageal reflux disease)     History of bronchitis     Hypertension     PUD (peptic ulcer disease)     Vertigo        Social Hx  Social History     Socioeconomic History    Marital status:    Tobacco Use    Smoking status: Never    Smokeless tobacco: Never   Substance and Sexual Activity    Alcohol use: Never    Drug use: Never       Family Hx  Family History   Problem Relation Age of Onset    Hypertension Sister     Thyroid Disease Sister     Anesth Problems Neg Hx        ALLERGIES  Allergies   Allergen Reactions    Molds & Smuts Cough     AND NOSE (NASAL PASSAGEWAYS -BURNS )    Penicillin G Rash    Sulfa Antibiotics Rash       CURRENT MEDS  Current Outpatient Medications   Medication Sig Dispense Refill    gabapentin (NEURONTIN) 100 MG capsule Take 1 capsule by mouth nightly.      lisinopril-hydroCHLOROthiazide (PRINZIDE;ZESTORETIC) 20-25 MG per tablet Take 1 tablet by mouth daily      omeprazole (PRILOSEC OTC) 20 MG tablet Take 1 tablet by mouth

## 2024-08-14 ENCOUNTER — TELEPHONE (OUTPATIENT)
Age: 74
End: 2024-08-14

## 2024-08-14 ENCOUNTER — HOSPITAL ENCOUNTER (OUTPATIENT)
Facility: HOSPITAL | Age: 74
Discharge: HOME | End: 2024-08-17
Attending: PSYCHIATRY & NEUROLOGY
Payer: MEDICARE

## 2024-08-14 DIAGNOSIS — G89.29 CHRONIC LEFT-SIDED LOW BACK PAIN WITH LEFT-SIDED SCIATICA: ICD-10-CM

## 2024-08-14 DIAGNOSIS — M54.42 CHRONIC LEFT-SIDED LOW BACK PAIN WITH LEFT-SIDED SCIATICA: ICD-10-CM

## 2024-08-14 DIAGNOSIS — G89.29 CHRONIC LEFT-SIDED LOW BACK PAIN WITH LEFT-SIDED SCIATICA: Primary | ICD-10-CM

## 2024-08-14 DIAGNOSIS — M54.42 CHRONIC LEFT-SIDED LOW BACK PAIN WITH LEFT-SIDED SCIATICA: Primary | ICD-10-CM

## 2024-08-14 PROCEDURE — 72148 MRI LUMBAR SPINE W/O DYE: CPT

## 2024-08-22 NOTE — TELEPHONE ENCOUNTER
Called pt.  verified. Inform pt that Dr. Sandoval states: MRI lumbar spine showing severe left L4L5 foraminal stenosis. Will refer to neurosurgery for surgical options. Pt verbalizes understanding and was given the number to Dr. Doshi office to follow up on appt.

## 2024-08-30 ENCOUNTER — TELEPHONE (OUTPATIENT)
Age: 74
End: 2024-08-30

## 2024-09-03 NOTE — TELEPHONE ENCOUNTER
MRI result and OV notes 8/5/24 faxed to Dr. Doshi office on 8/22/24 with confirmation and scanned in chart.

## 2024-09-05 ENCOUNTER — TELEPHONE (OUTPATIENT)
Age: 74
End: 2024-09-05

## 2024-09-26 ENCOUNTER — PROCEDURE VISIT (OUTPATIENT)
Age: 74
End: 2024-09-26

## 2024-09-26 DIAGNOSIS — G89.29 CHRONIC LEFT-SIDED LOW BACK PAIN WITH LEFT-SIDED SCIATICA: Primary | ICD-10-CM

## 2024-09-26 DIAGNOSIS — M54.42 CHRONIC LEFT-SIDED LOW BACK PAIN WITH LEFT-SIDED SCIATICA: Primary | ICD-10-CM

## 2025-05-22 NOTE — PROGRESS NOTES
eyes movement, with no nystagmus, no diplopia, no ptosis.  Normal gag and swallow.  All remaining cranial nerves were normal  Motor function: 5/5 in all extremities  Sensory: intact to LT, PP and JPS  DTRs + in all extremities, (-) Babinski  Good FTN and HTS   Gait: Normal    Assessment:       ICD-10-CM    1. Cognitive impairment  R41.89 TSH     Vitamin B12 & Folate     [facility] Gann, PsyD, Neuropsychology, [place]     APOE Alzheimer's Risk ([facility] Default)     MRI BRAIN WO CONTRAST      2. Other general symptoms and signs  R68.89 TSH          Possible MCI (MoCA score 19/30) or early stage Alzheimer's dementia    L L4 radiculopathy due to severe L L4 foraminal stenosis     MRI lumbar spine (8/14/24)  L4-L5: Loss of disc height. Facet arthropathy. Circumferential  protrusion/osteophyte. The canal is patent. There is severe left foraminal  stenosis.     EMG/NCS of the L LE (9/26/24) showing chronic, left L4 motor radiculopathy.  There is no evidence of a peripheral neuropathy.    Plan:   I had a long discussion with patient and sister. Discussed diagnosis, pathophysiology prognosis, available treatment and formulating plan. All questions were answered.  2.  Will do MRI Brain to look for stroke for possible LEQEMBI treatment  3. Blood test for APOE4, Vit B12, TSH and Folate. Patient to call for results  4. Urgent neuropsychological testing  5. Depending on above, will consider Amyloid scan for possible LEQEMBI treatment  6. Patient/family given education regarding the potential risks and benefits of Leqembi. Additionally, these potential risks/benefits have been discussed with time given for questions and clarification. Specific counselling regarding the risk of ARIA related side effects and infusion reactions, was given. The patient/Family agree to comply with the monitoring required to mitigate side effects including but not limited to APOE jose testing, baseline MRI, and subsequent MRI scans as

## 2025-05-23 ENCOUNTER — OFFICE VISIT (OUTPATIENT)
Age: 75
End: 2025-05-23
Payer: MEDICARE

## 2025-05-23 VITALS
DIASTOLIC BLOOD PRESSURE: 70 MMHG | SYSTOLIC BLOOD PRESSURE: 130 MMHG | TEMPERATURE: 96 F | HEIGHT: 65 IN | OXYGEN SATURATION: 97 % | HEART RATE: 73 BPM | BODY MASS INDEX: 25.96 KG/M2

## 2025-05-23 DIAGNOSIS — R68.89 OTHER GENERAL SYMPTOMS AND SIGNS: ICD-10-CM

## 2025-05-23 DIAGNOSIS — R41.89 COGNITIVE IMPAIRMENT: ICD-10-CM

## 2025-05-23 DIAGNOSIS — R41.89 COGNITIVE IMPAIRMENT: Primary | ICD-10-CM

## 2025-05-23 PROCEDURE — 1123F ACP DISCUSS/DSCN MKR DOCD: CPT | Performed by: PSYCHIATRY & NEUROLOGY

## 2025-05-23 PROCEDURE — 99215 OFFICE O/P EST HI 40 MIN: CPT | Performed by: PSYCHIATRY & NEUROLOGY

## 2025-05-30 ENCOUNTER — RESULTS FOLLOW-UP (OUTPATIENT)
Age: 75
End: 2025-05-30

## 2025-05-30 LAB
FOLATE SERPL-MCNC: 7.5 NG/ML
TSH SERPL DL<=0.005 MIU/L-ACNC: 1.49 UIU/ML (ref 0.45–4.5)
VIT B12 SERPL-MCNC: 321 PG/ML (ref 232–1245)

## 2025-06-04 ENCOUNTER — TELEPHONE (OUTPATIENT)
Age: 75
End: 2025-06-04

## 2025-06-04 LAB
APOE GENE MUT ANL BLD/T: NORMAL
CITATION REF LAB TEST: NORMAL
LABORATORY COMMENT REPORT: NORMAL
PROVIDER SIGNING NAME: NORMAL
REF LAB TEST METHOD: NORMAL
TEST PERFORMANCE INFO SPEC: NORMAL
TEST PERFORMANCE INFO SPEC: NORMAL

## 2025-06-04 NOTE — TELEPHONE ENCOUNTER
Spoke with patient and advise of Dr Sandoval's instructions to take vitamin B-12 1000 mcg daily patient voiced understanding

## 2025-06-05 ENCOUNTER — HOSPITAL ENCOUNTER (OUTPATIENT)
Facility: HOSPITAL | Age: 75
Discharge: HOME | End: 2025-06-08
Attending: PSYCHIATRY & NEUROLOGY
Payer: MEDICARE

## 2025-06-05 DIAGNOSIS — R41.89 COGNITIVE IMPAIRMENT: ICD-10-CM

## 2025-06-05 PROCEDURE — 70551 MRI BRAIN STEM W/O DYE: CPT

## 2025-07-23 ENCOUNTER — PROCEDURE VISIT (OUTPATIENT)
Age: 75
End: 2025-07-23

## 2025-07-23 ENCOUNTER — OFFICE VISIT (OUTPATIENT)
Age: 75
End: 2025-07-23

## 2025-07-23 DIAGNOSIS — F41.9 ANXIETY AND DEPRESSION: ICD-10-CM

## 2025-07-23 DIAGNOSIS — G31.84 MILD COGNITIVE IMPAIRMENT: Primary | ICD-10-CM

## 2025-07-23 DIAGNOSIS — F02.B0 MODERATE LATE ONSET ALZHEIMER DEMENTIA WITHOUT BEHAVIORAL DISTURBANCE, PSYCHOTIC DISTURBANCE, MOOD DISTURBANCE, OR ANXIETY (HCC): Primary | ICD-10-CM

## 2025-07-23 DIAGNOSIS — R41.89 COGNITIVE IMPAIRMENT: ICD-10-CM

## 2025-07-23 DIAGNOSIS — F32.A ANXIETY AND DEPRESSION: ICD-10-CM

## 2025-07-23 DIAGNOSIS — R45.89 ANXIETY ABOUT HEALTH: ICD-10-CM

## 2025-07-23 DIAGNOSIS — G30.1 MODERATE LATE ONSET ALZHEIMER DEMENTIA WITHOUT BEHAVIORAL DISTURBANCE, PSYCHOTIC DISTURBANCE, MOOD DISTURBANCE, OR ANXIETY (HCC): Primary | ICD-10-CM

## 2025-07-23 NOTE — PROGRESS NOTES
[facility] NEUROSCIENCE [facility]/EMERGENCY CENTER  NEUROLOGY CLINIC   [address]   [place] [address]   [phone] Office   [phone] Fax      Neuropsychology    Initial Diagnostic Interview Note      Referral:  Chuy House MD    Diana To is a 75 y.o. right handed  female who was accompanied by her sister to the initial clinical interview on 7/23/2025 .  Please refer to her medical records for details pertaining to her history.   At the start of the appointment, I reviewed the patient's [facility] Epic Chart (including Media scanned in from previous providers) for the active Problem List, all pertinent Past Medical Hx, medications, recent radiologic and laboratory findings.  In addition, I reviewed pt's documented Immunization Record and Encounter History.     Chief Complaint: New patient, establish care, Neurocognitive and psychologic concerns    The patient  has a past medical history of Asthma, Cancer (HCC), GERD (gastroesophageal reflux disease), History of bronchitis, Hypertension, PUD (peptic ulcer disease), and Vertigo.    She  has a past surgical history that includes Cardiac catheterization; orthopedic surgery (Left, 2012); neurological surgery; gyn (1975); Colonoscopy; and Upper gastrointestinal endoscopy.    High school completed without history of previously diagnosed LD and/or receipt of special education services.  She drives without issue. Lives my self.  Niece are next door and she watches the andrews sometimes.  She does her medications and finances.  She sleeps well.  Appetite is fine.  She did have a fall recently about three months ago.      The patient saw Dr. Doshi back in October of last year and had lower back surgery with note of significant improvement of lower back issues. She has been having significantly worsening memory problems. She forgets conversations, misplaces things, starts tasks and does not

## 2025-07-24 NOTE — PROGRESS NOTES
[facility] NEUROSCIENCE [facility]/EMERGENCY CENTER  NEUROLOGY CLINIC   [address]   [place] [address]   [phone] Office   [phone] Fax      Focused Neuropsychological Evaluation Report      Referral:  Chuy House MD    Diana To is a 75 y.o. right handed  female who was accompanied by her sister to the initial clinical interview on 7/23/2025 .  Please refer to her medical records for details pertaining to her history.   At the start of the appointment, I reviewed the patient's [facility] Epic Chart (including Media scanned in from previous providers) for the active Problem List, all pertinent Past Medical Hx, medications, recent radiologic and laboratory findings.  In addition, I reviewed pt's documented Immunization Record and Encounter History.     Chief Complaint:  Neurocognitive and psychologic concerns    The patient  has a past medical history of Asthma, Cancer (HCC), GERD (gastroesophageal reflux disease), History of bronchitis, Hypertension, PUD (peptic ulcer disease), and Vertigo.    She  has a past surgical history that includes Cardiac catheterization; orthopedic surgery (Left, 2012); neurological surgery; gyn (1975); Colonoscopy; and Upper gastrointestinal endoscopy.    High school completed without history of previously diagnosed LD and/or receipt of special education services.  She drives without issue. Lives my self.  Niece are next door and she watches the andrews sometimes.  She does her medications and finances.  She sleeps well.  Appetite is fine.  She did have a fall recently about three months ago.      The patient saw Dr. Tico dodd in October of last year and had lower back surgery with note of significant improvement of lower back issues. She has been having significantly worsening memory problems. She forgets conversations, misplaces things, starts tasks and does not complete.  She has difficulties following

## 2025-07-28 ENCOUNTER — TELEPHONE (OUTPATIENT)
Age: 75
End: 2025-07-28

## 2025-07-28 NOTE — TELEPHONE ENCOUNTER
Spoke w/ Belen. HIPAA verified. Inform her that Dr. Sandoval does not have asap appts. Belen verbalizes understanding. Pt is scheduled for 8/1/25 at 11:20AM to discuss neuro testing results.   Spoke with pt - states she has been experiencing symptoms of congestion, cough, headache, slight sore throat and fatigue since yesterday. Pt states her daughter tested positive back on 12/21/21 and pt tested negative on 12/23/21. She is requesting MD to order COVID test for her. She has called around for test and can not find anywhere to get it.  Will forward to MD.

## 2025-07-28 NOTE — TELEPHONE ENCOUNTER
Is requesting a call to schedule a f/u appt ASAP. Informed that they were told at 's last visit that appt within 2 weeks is needed as pt needs to get on medication ASAP.    Please advise.
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
## 2025-08-01 ENCOUNTER — OFFICE VISIT (OUTPATIENT)
Age: 75
End: 2025-08-01
Payer: MEDICARE

## 2025-08-01 VITALS
SYSTOLIC BLOOD PRESSURE: 140 MMHG | DIASTOLIC BLOOD PRESSURE: 60 MMHG | HEART RATE: 70 BPM | OXYGEN SATURATION: 99 % | WEIGHT: 162 LBS | HEIGHT: 65 IN | RESPIRATION RATE: 18 BRPM | BODY MASS INDEX: 26.99 KG/M2

## 2025-08-01 DIAGNOSIS — G30.1 MODERATE LATE ONSET ALZHEIMER DEMENTIA WITHOUT BEHAVIORAL DISTURBANCE, PSYCHOTIC DISTURBANCE, MOOD DISTURBANCE, OR ANXIETY (HCC): Primary | ICD-10-CM

## 2025-08-01 DIAGNOSIS — F02.B0 MODERATE LATE ONSET ALZHEIMER DEMENTIA WITHOUT BEHAVIORAL DISTURBANCE, PSYCHOTIC DISTURBANCE, MOOD DISTURBANCE, OR ANXIETY (HCC): Primary | ICD-10-CM

## 2025-08-01 PROCEDURE — 1123F ACP DISCUSS/DSCN MKR DOCD: CPT | Performed by: PSYCHIATRY & NEUROLOGY

## 2025-08-01 PROCEDURE — 1159F MED LIST DOCD IN RCRD: CPT | Performed by: PSYCHIATRY & NEUROLOGY

## 2025-08-01 PROCEDURE — 99215 OFFICE O/P EST HI 40 MIN: CPT | Performed by: PSYCHIATRY & NEUROLOGY

## 2025-08-01 PROCEDURE — 1126F AMNT PAIN NOTED NONE PRSNT: CPT | Performed by: PSYCHIATRY & NEUROLOGY

## 2025-08-01 RX ORDER — DONEPEZIL HYDROCHLORIDE 5 MG/1
5 TABLET, FILM COATED ORAL NIGHTLY
Qty: 30 TABLET | Refills: 5 | Status: SHIPPED | OUTPATIENT
Start: 2025-08-01

## 2025-08-01 NOTE — PROGRESS NOTES
Room:  I have reviewed all needed documentation in preparation for visit. Verified patient by name and date of birth  Chief Complaint   Patient presents with    Results       Vitals:    08/01/25 1123   BP: (!) 140/60   Pulse: 70   Resp: 18   SpO2: 99%   Weight: 73.5 kg (162 lb)   Height: 1.651 m (5' 5\")        Health Maintenance Due   Topic Date Due    Hepatitis C screen  Never done    Shingles vaccine (1 of 2) Never done    DEXA (modify frequency per FRAX score)  Never done    Respiratory Syncytial Virus (RSV) Pregnant or age 60 yrs+ (1 - 1-dose 75+ series) Never done    COVID-19 Vaccine (4 - 2024-25 season) 09/01/2024    DTaP/Tdap/Td vaccine (2 - Td or Tdap) 11/14/2024    Annual Wellness Visit (Medicare Advantage)  Never done    Flu vaccine (1) 08/01/2025    Depression Monitoring  08/05/2025        1. \"Have you been to the ER, urgent care clinic since your last visit?  Hospitalized since your last visit?\" No     2. \"Have you seen or consulted any other health care providers outside of the [facility] since your last visit?\" No

## (undated) DEVICE — STERILE POLYISOPRENE POWDER-FREE SURGICAL GLOVES WITH EMOLLIENT COATING: Brand: PROTEXIS

## (undated) DEVICE — Device

## (undated) DEVICE — SOL IRR STRL H2O 1000ML BTL --

## (undated) DEVICE — HANDPIECE SET WITH BONE CLEANING TIP AND SUCTION TUBE: Brand: INTERPULSE

## (undated) DEVICE — GARMENT,MEDLINE,DVT,INT,CALF,MED, GEN2: Brand: MEDLINE

## (undated) DEVICE — CONTAINER,SPECIMEN,3OZ,OR STRL: Brand: MEDLINE

## (undated) DEVICE — CUSTOM CAST PD STR

## (undated) DEVICE — TAPE,CLOTH/SILK,CURAD,3"X10YD,LF,40/CS: Brand: CURAD

## (undated) DEVICE — DRAPE,EXTREMITY,89X128,STERILE: Brand: MEDLINE

## (undated) DEVICE — Z DISCONTINUED USE 2744636  DRESSING AQUACEL 14 IN ALG W3.5XL14IN POLYUR FLM CVR W/ HYDRCOLL

## (undated) DEVICE — CARTRIDGE BNE CEM MIX UNIV TWR VAC ROTOR BRK OFF NOZ W/O

## (undated) DEVICE — PREP SKN CHLRAPRP APL 26ML STR --

## (undated) DEVICE — SUTURE VCRL SZ 2 L54IN ABSRB UD L65MM TP-1 1/2 CIR J880T

## (undated) DEVICE — NEEDLE HYPO 22GA L1.5IN BLK S STL HUB POLYPR SHLD REG BVL

## (undated) DEVICE — T4 HOOD

## (undated) DEVICE — STRYKER PERFORMANCE SERIES SAGITTAL BLADE: Brand: STRYKER PERFORMANCE SERIES

## (undated) DEVICE — TOTAL TRAY, 16FR 10ML SIL FOLEY, URN: Brand: MEDLINE

## (undated) DEVICE — SUTURE VCRL SZ 2-0 L36IN ABSRB UD L40MM CT 1/2 CIR J957H

## (undated) DEVICE — SUTURE STRATAFIX SYMMETRIC PDS + SZ 1 L18IN ABSRB VLT L48MM SXPP1A400

## (undated) DEVICE — SYR 20ML LL STRL LF --

## (undated) DEVICE — 3M™ IOBAN™ 2 ANTIMICROBIAL INCISE DRAPE 6651EZ: Brand: IOBAN™ 2

## (undated) DEVICE — STRAP,POSITIONING,KNEE/BODY,FOAM,4X60": Brand: MEDLINE

## (undated) DEVICE — SUTURE VCRL SZ 1 L36IN ABSRB UD L36MM CT-1 1/2 CIR J947H

## (undated) DEVICE — SCRUB DRY SURG EZ SCRUB BRUSH PREOPERATIVE GRN

## (undated) DEVICE — BANDAGE COMPR M W6INXL10YD WHT BGE VELC E MTRX HK AND LOOP

## (undated) DEVICE — 4-PORT MANIFOLD: Brand: NEPTUNE 2

## (undated) DEVICE — INFECTION CONTROL KIT SYS

## (undated) DEVICE — STERILE POLYISOPRENE POWDER-FREE SURGICAL GLOVES: Brand: PROTEXIS

## (undated) DEVICE — REM POLYHESIVE ADULT PATIENT RETURN ELECTRODE: Brand: VALLEYLAB